# Patient Record
Sex: FEMALE | Race: WHITE | NOT HISPANIC OR LATINO | Employment: OTHER | URBAN - METROPOLITAN AREA
[De-identification: names, ages, dates, MRNs, and addresses within clinical notes are randomized per-mention and may not be internally consistent; named-entity substitution may affect disease eponyms.]

---

## 2017-02-03 ENCOUNTER — ALLSCRIPTS OFFICE VISIT (OUTPATIENT)
Dept: OTHER | Facility: OTHER | Age: 67
End: 2017-02-03

## 2017-02-04 LAB
T3FREE SERPL-MCNC: 234 NG/DL (ref 71–180)
T4 FREE SERPL-MCNC: 2.09 NG/DL (ref 0.82–1.77)
TSH SERPL DL<=0.05 MIU/L-ACNC: 0.03 UIU/ML (ref 0.45–4.5)

## 2017-02-07 ENCOUNTER — GENERIC CONVERSION - ENCOUNTER (OUTPATIENT)
Dept: OTHER | Facility: OTHER | Age: 67
End: 2017-02-07

## 2017-04-12 ENCOUNTER — GENERIC CONVERSION - ENCOUNTER (OUTPATIENT)
Dept: OTHER | Facility: OTHER | Age: 67
End: 2017-04-12

## 2018-01-13 VITALS
BODY MASS INDEX: 20.66 KG/M2 | HEIGHT: 65 IN | WEIGHT: 124 LBS | DIASTOLIC BLOOD PRESSURE: 70 MMHG | TEMPERATURE: 97.9 F | SYSTOLIC BLOOD PRESSURE: 100 MMHG

## 2018-01-14 NOTE — MISCELLANEOUS
Message  Message Free Text Note Form: TC from daughter Katty Tirado   States Mom saw Dr Carley Whitmore on Thursday and was under the impression that he was going to prescribe a diuretic for her edema  After reviewing the note from 9/29, daughter states that mother is improved from when she was seen with decreased edema and decreased SOB  Was advised that ER with possible hospitalization was discussed with patient but daughter feels that, with decrease in symptoms, she will call the cardiologist in the am  Reviewed S/S of worsening CHF and daughter will take mom to er if symptoms worsen        Signatures   Electronically signed by : Sondra Doss; Oct  2 2016  1:32PM EST                       (Author)

## 2018-01-14 NOTE — MISCELLANEOUS
Assessment    1  Hyperthyroidism (242 90) (E05 90)   2  Atrial fibrillation (427 31) (I48 91)    Discussion/Summary  Discussion Summary:   In summary then this is a 12-year-old woman with hyperthyroidism which is now treated with methimazole  Heart rate is controlled today though she remains in atrial fibrillation  She is on Xarelto and has no evidence of bleeding diathesis  She is also on metoprolol and lisinopril  She has follow-up with cardiology scheduled later this month and follow-up with endocrinology in November  We'll see her back in 4 months or sooner as needed  She's given an influenza vaccine today  Medication SE Review and Pt Understands Tx: Possible side effects of new medications were reviewed with the patient/guardian today  The treatment plan was reviewed with the patient/guardian  The patient/guardian understands and agrees with the treatment plan      History of Present Illness  TCM Communication [facility]: The patient is being contacted for follow-up after hospitalization  Hospital records were reviewed  She was hospitalized at [facility]  The dates of hospitalization: 10/2/16-10/4/16, date of admission: 10/2/16, date of discharge: 10/4/16  Diagnosis: Atrial Fibrillation, CHF, hyperthyroidism  She was discharged to home  Medications reviewed and updated today  She scheduled a follow up appointment  Follow-up appointments with other specialists: Dr Lelo Sommers, [facility]  The patient is currently asymptomatic  Counseling was provided to the patient  Topics counseled included importance of compliance with treatment  Communication performed and completed by Kevin Lovett      Active Problems    1  Atrial fibrillation (427 31) (I48 91)   2  Cellulitis of hand (682 4) (L03 119)   3  Encounter for screening colonoscopy (V76 51) (Z12 11)   4  Encounter for screening mammogram for breast cancer (V76 12) (Z12 31)   5   Hyperthyroidism (242 90) (E05 90)    Social History    · Current every day smoker (305 1) (F17 200)    Current Meds   1  Diltiazem HCl ER Coated Beads 120 MG Oral Capsule Extended Release 24 Hour;   TAKE 1 CAPSULE DAILY  Requested for: 70JWP3052; Last Rx:[id number] Ordered    Allergies    1  Bactrim TABS   2  Biaxin TABS    Vitals  Signs   Recorded: 60YUH1254 11:19AM   Heart Rate: 88  Recorded: 82VTW8256 56:80LU   Systolic: 775, LUE, Sitting  Diastolic: 70, LUE, Sitting  Temperature: 98 3 F  Height: 5 ft 4 5 in  Weight: 111 lb   BMI Calculated: 18 76  BSA Calculated: 1 53    Physical Exam    Constitutional   General appearance: No acute distress, well appearing and well nourished  Pulmonary   Respiratory effort: No increased work of breathing or signs of respiratory distress  Auscultation of lungs: Clear to auscultation  Cardiovascular   Auscultation of heart: Abnormal   The heart rate was normal at 88 bpm  The rhythm was irregularly irregular  Heart sounds: normal S1 and normal S2  no murmurs were heard  Examination of extremities for edema and/or varicosities: Normal     Carotid pulses: Normal   No JVD  Musculoskeletal   Digits and nails: Normal without clubbing or cyanosis  Normal capillary RF     Psychiatric   Orientation to person, place, and time: Normal     Mood and affect: Normal          Signatures   Electronically signed by : MARCELLO Hamilton ; Oct 10 2016 11:22AM EST                       (Author)

## 2018-01-14 NOTE — RESULT NOTES
Verified Results  (1) T3 TOTAL [id number] 12:00AM Dorthula Kishor     Test Name Result Flag Reference   Triiodothyronine (T3) 234 ng/dL H      (LC) TSH+Free T4 [id number] 12:00AM Dorthula Kishor     Test Name Result Flag Reference   TSH 0 025 uIU/mL L 0 450-4 500   T4,Free(Direct) 2 09 ng/dL H 0 82-1 77

## 2018-01-15 NOTE — PROGRESS NOTES
History of Present Illness  Care Coordination Encounter Information:   Type of Encounter: Telephonic    Spoke to Patient  Care Coordination [facility] Nurse [facility]:   The reason for call is to discuss outreach for follow up/needed services  Spoke to Joselin Kaur in reference to recent hospitalization and to see how she is feeling  She said she "feels good" and she spoke to the office to schedule an appointment and will bring hospital discharge summary for Dr Tatyana Lopez  Admit to [facility] on Oct 2 and discharged on Oct 4  New medications are as follow:  Metoprolol 25 mgs once daily  Rivaroxaban 20 mgs at night  Lisinopril 2 5 mgs once daily  Methimazole 15 mgs 3 pills in am; 3 pills in pm   She is also waiting for a return call from Dr Belle Murray (endocrinologist) who she will be seeing for her hyperthyroidism  She is to call me if she has additional questions prior to physician's appointment  Active Problems    1  Atrial fibrillation (427 31) (I48 91)   2  Cellulitis of hand (682 4) (L03 119)   3  Encounter for screening colonoscopy (V76 51) (Z12 11)   4  Encounter for screening mammogram for breast cancer (V76 12) (Z12 31)   5  Hyperthyroidism (242 90) (E05 90)    Social History    · Current every day smoker (305 1) (F17 200)    Current Meds    1  Diltiazem HCl ER Coated Beads 120 MG Oral Capsule Extended Release 24 Hour;   TAKE 1 CAPSULE DAILY  Requested for: [id number]; Last Rx:[id number] Ordered    Allergies    1  Bactrim TABS   2  Biaxin TABS    End of Encounter Meds    1  Diltiazem HCl ER Coated Beads 120 MG Oral Capsule Extended Release 24 Hour;   TAKE 1 CAPSULE DAILY  Requested for: [id number]; Last Rx:[id number] Ordered    Future Appointments    Date/Time Provider Specialty Site   10/10/2016 10:15 AM MARCELLO Honeycutt   Family Medicine [address] [place] [address]     Signatures   Electronically signed by : Zainab Moe RN; Oct  6 2016 11:24AM EST                       (Author)

## 2018-03-27 ENCOUNTER — OFFICE VISIT (OUTPATIENT)
Dept: ENDOCRINOLOGY | Facility: HOSPITAL | Age: 68
End: 2018-03-27
Payer: COMMERCIAL

## 2018-03-27 VITALS
DIASTOLIC BLOOD PRESSURE: 64 MMHG | WEIGHT: 147.6 LBS | BODY MASS INDEX: 24.59 KG/M2 | HEIGHT: 65 IN | SYSTOLIC BLOOD PRESSURE: 108 MMHG | HEART RATE: 84 BPM

## 2018-03-27 DIAGNOSIS — E05.90 HYPERTHYROIDISM: ICD-10-CM

## 2018-03-27 DIAGNOSIS — E05.00 GRAVES DISEASE: Primary | ICD-10-CM

## 2018-03-27 PROCEDURE — 99203 OFFICE O/P NEW LOW 30 MIN: CPT | Performed by: INTERNAL MEDICINE

## 2018-03-27 RX ORDER — LISINOPRIL 2.5 MG/1
TABLET ORAL DAILY
Refills: 4 | COMMUNITY
Start: 2018-03-17

## 2018-03-27 RX ORDER — METOPROLOL SUCCINATE 25 MG/1
TABLET, EXTENDED RELEASE ORAL DAILY
Refills: 4 | COMMUNITY
Start: 2018-03-17

## 2018-03-27 RX ORDER — METHIMAZOLE 5 MG/1
2.5 TABLET ORAL DAILY
COMMUNITY
End: 2018-08-01 | Stop reason: SDUPTHER

## 2018-03-27 RX ORDER — RIVAROXABAN 20 MG/1
20 TABLET, FILM COATED ORAL
Refills: 4 | COMMUNITY
Start: 2018-03-17

## 2018-03-27 NOTE — PATIENT INSTRUCTIONS
Last blood work in February was underactive  I agree with stopping Methimazole last month  I recommend repeat blood work in mid April 2018  Call 1 week after blood work for the results if you don't hear from us  Follow up visits to be determined based on blood work  Graves Disease   WHAT YOU NEED TO KNOW:   What is Graves disease? Graves disease is an autoimmune disease that causes your immune system to attack your thyroid gland  This causes your body to make too much thyroid hormone and leads to hyperthyroidism  The thyroid gland is a butterfly-shaped organ that is found in the front part of your neck  Thyroid hormones regulate body temperature, heart rate, and weight  What increases my risk for Graves disease? The exact cause of Graves disease is unknown  Certain things may increase your risk of Graves disease  This includes having a family history of Graves disease or being female  Conditions such as type 1 diabetes, rheumatoid arthritis, and vitiligo (a skin disorder) may also increase your risk  Graves disease is more common among females and people younger than 36  Pregnancy affects the thyroid and can trigger Graves disease in some women  What are the signs and symptoms of Graves disease? · Nervousness, irritability, fatigue, or muscle weakness    · Trouble sleeping or increased sensitivity to heat    · Hand tremors or increased sweating    · An irregular or fast heartbeat    · Diarrhea or more frequent bowel movements than usual    · Losing weight without trying    · Children may have a decreased attention span that leads to a drop in school grades    · Symptoms of Graves ophthalmology (GO) such as protruding eyeballs, puffy eyelids, double vision, light sensitivity, or pain or pressure in your eyes    · Reddening or thickening of skin on the shins  How is Graves disease diagnosed? Your healthcare provider will examine you   He may gently feel the area over your thyroid gland for swelling or xochitl  He will also ask about your symptoms and any medical conditions you have  You may also need the following tests:  · Blood tests  will be done to measure your thyroid levels  They may also be done to check for antibodies that show you may have Graves disease  · Radioactive iodine uptake tests  may be done to show how well your thyroid works  It may show that your thyroid gland is producing too much hormone  · A thyroid scan  may be done to show the size, shape, and position of your thyroid  A small amount of radioactive tracer is given to create pictures of your thyroid  What is a thyroid storm? A thyroid storm happens when your thyroid hormone levels get too high  Your body temperature may go very high, your heart may beat very fast, and you may have problems thinking  You may have increased sweating, vomiting, or diarrhea  You may have seizures or go into a coma  Thyroid storm may happen if you have hyperthyroidism and get an infection or stop taking your thyroid medicine  Injuries, burns, and certain medicines can also cause a thyroid storm  How is Graves disease treated? · Antithyroid medicines  decrease thyroid hormone levels and your symptoms  · Radioactive iodine  is given to damage or kill some thyroid gland cells  This may decrease the amount of thyroid hormone produced  Tell your healthcare provider if you are breastfeeding, or you know or think you are pregnant  This medicine can be harmful to your baby  · Heart medicine  may be given to control and regulate your heart rate  · Treatment for eye problems  may also be needed  Eye drops can help relieve dry or irritated eyes  Steroids may be given to decrease eye swelling and pain  Special lenses may be needed if you have double vision  Radiation may be applied to your eyes to decrease swelling if you have severe eye problems  · Surgery  may be done to remove all or part of the thyroid gland  How can I manage Graves disease? · Do not smoke or be around secondhand smoke  Cigarette smoke increases your risk of GO or can make it worse if you already have it  Nicotine and other chemicals in cigarettes and cigars can also cause lung damage  Ask your healthcare provider for information if you currently smoke and need help to quit  E-cigarettes or smokeless tobacco still contain nicotine  Talk to your healthcare provider before you use these products  · Sleep with your head elevated if you have eye symptoms  This may help to decrease swelling of your eyelids  Your healthcare provider may recommend that you sleep with your eyes taped shut  This can help to prevent dry eyes  · Sunglasses  may help to decrease light sensitivity  · Take medicine as directed and go to follow-up appointments  Do not stop taking your medicine unless your healthcare provider has asked you to  This could cause your thyroid levels to increase and lead to other health problems  You will need regular follow-up appointments to have your thyroid levels checked  Call 911 or have someone call 911 for any of the following:   · You have a seizure  · You feel like you are going to faint  · You have sudden chest pain or shortness of breath  When should I seek immediate care? · You have a fever  · You have trouble thinking clearly  · You are shaking or sweating  · Your heart is beating faster than usual, or you have an irregular heartbeat  When should I contact my healthcare provider? · You have nausea, vomiting, or diarrhea  · You feel nervous, restless, confused or agitated       · You run out of thyroid medicine, or you have stopped taking it  · You have questions or concerns about your condition or care  CARE AGREEMENT:   You have the right to help plan your care  Learn about your health condition and how it may be treated  Discuss treatment options with your caregivers to decide what care you want to receive   You always have the right to refuse treatment  The above information is an  only  It is not intended as medical advice for individual conditions or treatments  Talk to your doctor, nurse or pharmacist before following any medical regimen to see if it is safe and effective for you  © 2017 [employer] Information is for End User's use only and may not be sold, redistributed or otherwise used for commercial purposes  All illustrations and images included in CareNotes® are the copyrighted property of [employer]  or [employer]  Graves Disease   AMBULATORY CARE:   Graves disease  is an autoimmune disease that causes your immune system to attack your thyroid gland  This causes your body to make too much thyroid hormone and leads to hyperthyroidism  The thyroid gland is a butterfly-shaped organ that is found in the front part of your neck  Thyroid hormones regulate body temperature, heart rate, and weight  Common signs and symptoms include the following:   · Nervousness, irritability, fatigue, or muscle weakness    · Trouble sleeping or increased sensitivity to heat    · Hand tremors or increased sweating    · An irregular or fast heartbeat    · Diarrhea or more frequent bowel movements than usual    · Losing weight without trying    · Children may have a decreased attention span that leads to a drop in school grades    · Symptoms of Graves ophthalmology (GO) such as protruding eyeballs, puffy eyelids, double vision, light sensitivity, or pain or pressure in your eyes    · Reddening or thickening of skin on the shins  Thyroid storm:  A thyroid storm happens when your thyroid hormone levels get too high  Your body temperature may go very high, your heart may beat very fast, and you may have problems thinking  You may have increased sweating, vomiting, or diarrhea  You may have seizures or go into a coma   Thyroid storm may happen if you have hyperthyroidism and get an infection or stop taking your thyroid medicine  Injuries, burns, and certain medicines can also cause a thyroid storm  Call 911 or have someone call 911 for any of the following:   · You have a seizure  · You feel like you are going to faint  · You have sudden chest pain or shortness of breath  Seek care immediately if:   · You have a fever  · You have trouble thinking clearly  · You are shaking or sweating  · Your heart is beating faster than usual, or you have an irregular heartbeat  Contact your healthcare provider if:   · You have nausea, vomiting, or diarrhea  · You feel nervous, restless, confused or agitated       · You run out of thyroid medicine, or you have stopped taking it  · You have questions or concerns about your condition or care  Treatment for Graves disease  may include any of the following:  · Antithyroid medicines  decrease thyroid hormone levels and your symptoms  · Radioactive iodine  is given to damage or kill some thyroid gland cells  This may decrease the amount of thyroid hormone produced  Tell your healthcare provider if you are breastfeeding, or you know or think you are pregnant  This medicine can be harmful to your baby  · Heart medicine  may be given to control and regulate your heart rate  · Treatment for eye problems  may also be needed  Eye drops can help to relieve dry or irritated eyes  Steroids may be given to decrease eye swelling and pain  Special lenses may be needed if you have double vision  Radiation may be applied to your eyes to decrease swelling if you have severe eye problems  · Surgery  may be done to remove all or part of the thyroid gland  Manage Graves disease:   · Do not smoke or be around secondhand smoke  Cigarette smoke increases your risk of GO or can make it worse if you already have it  Nicotine and other chemicals in cigarettes and cigars can also cause lung damage   Ask your healthcare provider for information if you currently smoke and need help to quit  E-cigarettes or smokeless tobacco still contain nicotine  Talk to your healthcare provider before you use these products  · Sleep with your head elevated if you have eye symptoms  This may help to decrease swelling of your eyelids  Your healthcare provider may recommend that you sleep with your eyes taped shut  This can help to prevent dry eyes  · Sunglasses  may help decrease light sensitivity  · Take medicine as directed and go to follow-up appointments  Do not stop taking your medicine unless your healthcare provider has asked you to  This could increase your thyroid levels and lead to other health problems  You will need to go to regular follow-up appointments to have your thyroid levels checked  Follow up with your healthcare provider as directed: You may need to return for regular blood tests to check your thyroid hormone levels  This will help your healthcare provider decide if you are getting the right amount of medicine  Do not stop taking your medicines without talking to your healthcare provider first  Write down your questions so you remember to ask them during your visits  © 2017 [employer] [facility] Information is for End User's use only and may not be sold, redistributed or otherwise used for commercial purposes  All illustrations and images included in CareNotes® are the copyrighted property of [employer]  or [employer]  The above information is an  only  It is not intended as medical advice for individual conditions or treatments  Talk to your doctor, nurse or pharmacist before following any medical regimen to see if it is safe and effective for you

## 2018-03-27 NOTE — LETTER
March 27, 2018     [address]  [address] [place] [address]    Patient: Mehrdad Randhawa   YOB: 1950   Date of Visit: 3/27/2018       Dear Dr Coley Ano:    Thank you for referring Mehrdad Randhawa to me for evaluation  Below are my notes for this consultation  If you have questions, please do not hesitate to call me  I look forward to following your patient along with you  Sincerely,        Belen Vegas MD        CC: No Recipients  Belen Vegas MD  3/27/2018 12:25 PM  Sign at close encounter  3/27/2018    Assessment/Plan      Diagnoses and all orders for this visit:    Graves disease  -     T4, free Lab Collect  -     Thyroid stimulating immunoglobulin Lab Collect  -     TSH, 3rd generation Lab Collect  -     T3, free    Hyperthyroidism  -     T4, free Lab Collect  -     Thyroid stimulating immunoglobulin Lab Collect  -     TSH, 3rd generation Lab Collect  -     T3, free    Other orders  -     lisinopril (ZESTRIL) 2 5 mg tablet; Take by mouth    -     metoprolol succinate (TOPROL-XL) 25 mg 24 hr tablet; Take by mouth daily    -     XARELTO 20 MG tablet; Take 20 mg by mouth daily with breakfast    -     methimazole (TAPAZOLE) 5 mg tablet; Take 3 tablets by mouth 2 (two) times a day        Assessment/Plan:  Hyperthyroidism likely due to Graves disease  She was diagnosed with hyperthyroidism in October 2016 with a hospitalization for atrial fibrillation  Thyroid function tests show she was profoundly hyperthyroid with a fully suppressed TSH and markedly elevated free T4 and free T3  She was appropriately started on antithyroid medications  She has had struggles with hypothyroidism on these medicines and recently had her methimazole discontinued in February of 2018  I agree with this discontinuation based on the fact that her TSH was elevated in February 2018    I will have her repeat her TSH with a free T4, free T3, and thyroid-stimulating immunoglobulins in mid April 2018 to see if she is in remission off antithyroid medications  She was instructed to viktoriya the office 1 week after the blood work for results and follow up recommendations  While in the office, we did discuss the 3 treatments of hyperthyroidism due to Graves disease including antithyroid medications, I 131 treatment to ablate her thyroid, and surgery  She was educated that if she is in remission, she could developed hyperthyroidism again and should call if she develops hyperthyroid symptoms which were reviewed in the office  Follow-up will be determined by the blood work in April  CC: thyroid consult    History of Present Illness     HPI: Gail Bailey is a 69y o  year old female with history of hyperthyroidism here for evaluation of her thyroid  She was found with rapid heart beat by her PCP about 1 1/2year ago  She had shortness of breath, ended up in hospital October 2016 with atrial fibrillation and hyperthyroidism  She was started on Methimazole 5 mg BID  She also started lisinopril, metoprolol, and Xarelto  Thyroid levels in hospital showed TSH <0 02, Free T4 3 00, and Free T3 8 5  She was seeing another endocrinologist, but not feeling better and some personality differences  She saw the endocrinlologist last in February 2018 and was told she was in remission but still on methimazlole low dose and told to stop in february  Was on methimazole 15 mg Bid and dose tapered to 5 mg 1/2 daily  Thyroid hormone levels have fluctuated over the last year with a TSH from 0 025 to over 45  Did have abnormal thyroid tests 2014 and patient did not treat at that time  Off methimazole since about 2/20/18  She denies heat or cold intolerance  She denies tremors, anxiety, or insomnia  She has been having some recent fatigue and constipation  She has no diarrhea  She will still get an occasional palpitation  She denies dry skin, brittle nails, or hair loss    She did have a 40 lb weight loss prior to her diagnosis and has gained most of that weight back  She has no diplopia  She denies troubles with swallowing  Review of Systems   Constitutional: Positive for fatigue and unexpected weight change  Negative for diaphoresis  Had lost to 115 lbs on diagnosis of thyroid disease, had lost over 40 lbs  Uses a lot of vitamin from standard process: Cataplex B, Cataplex C, cardioplus, trace minerals  Also takes a thyroid support supplements  HENT: Positive for tinnitus  Negative for ear pain, hearing loss and trouble swallowing  Right ear tinnitus for years  Eyes: Positive for visual disturbance  No diplopia  Some occasional blurry vision  Respiratory: Negative for apnea and shortness of breath  Cardiovascular: Positive for palpitations and leg swelling  Negative for chest pain  Now an occasional palpitation  Sees cardiologist regularly  Had leg swelling on diagnosis of thyroid disease  Gastrointestinal: Positive for constipation  Negative for abdominal pain, diarrhea and nausea  Endocrine: Negative for cold intolerance and heat intolerance  Had hands and feet cold in the past worse with diagnosis, now gone  Musculoskeletal: Negative for arthralgias and back pain  Skin: Negative for rash  No dry skin, brittle nails, or hair loss  Neurological: Negative for dizziness, tremors, light-headedness, numbness and headaches  Psychiatric/Behavioral: Negative for sleep disturbance  The patient is not nervous/anxious  Was anxious with panic attacks when thyroid overactive  also had insomnia then too         Historical Information   Past Medical History:   Diagnosis Date    Atrial fibrillation ([address] )     Hypertension     with hyperthyroidism    Osteoarthritis      Past Surgical History:   Procedure Laterality Date    TUBAL LIGATION Bilateral      Social History   History   Alcohol Use No     History   Drug Use No     History   Smoking Status    Current Every Day Smoker    Packs/day: 0 50    Years: 0 00    Types: Cigarettes   Smokeless Tobacco    Never Used     Family History:   Family History   Problem Relation Age of Onset    Thyroid disease unspecified Mother     No Known Problems Father     Hypothyroidism Sister     Hashimoto's thyroiditis Sister     Goiter Sister     No Known Problems Brother     No Known Problems Sister     No Known Problems Sister     No Known Problems Brother     No Known Problems Brother     No Known Problems Son     No Known Problems Daughter        Meds/Allergies   Current Outpatient Prescriptions   Medication Sig Dispense Refill    lisinopril (ZESTRIL) 2 5 mg tablet Take by mouth    4    metoprolol succinate (TOPROL-XL) 25 mg 24 hr tablet Take by mouth daily    4    XARELTO 20 MG tablet Take 20 mg by mouth daily with breakfast    4    methimazole (TAPAZOLE) 5 mg tablet Take 3 tablets by mouth 2 (two) times a day       No current facility-administered medications for this visit  Allergies   Allergen Reactions    Sulfamethoxazole-Trimethoprim        Objective   Vitals: Blood pressure 108/64, pulse 84, height 5' 4 57" (1 64 m), weight 67 kg (147 lb 9 6 oz)  Invasive Devices          No matching active lines, drains, or airways          Physical Exam   Constitutional: She is oriented to person, place, and time  She appears well-developed and well-nourished  HENT:   Head: Normocephalic and atraumatic  Eyes: Conjunctivae and EOM are normal  Pupils are equal, round, and reactive to light  No lid lag, stare, proptosis, or periorbital edema  Neck: Normal range of motion  Neck supple  No thyromegaly present  No bruits over the thyroid  Cardiovascular: Normal rate, regular rhythm, normal heart sounds and intact distal pulses  No murmur heard  Pulmonary/Chest: Effort normal and breath sounds normal  She has no wheezes  Abdominal: Soft  Bowel sounds are normal  There is no tenderness     Musculoskeletal: Normal range of motion  She exhibits no edema or deformity  No tremor of the outstretched hands  No CVAT  NO spinous process tenderness  Lymphadenopathy:     She has no cervical adenopathy  Neurological: She is alert and oriented to person, place, and time  She has normal reflexes  Skin: Skin is warm and dry  No rash noted  Vitals reviewed  The history was obtained from the review of the chart and from the patient  Lab Results:    Bloodwork on February 13, 2018 showed a TSH of 9 92 with a free T4 of 1 0 and free T3 of 2 5  AST and ALT were normal  Blood work over the last year has had the TSH fluctuate from 0 025 to over 45  Lab Results   Component Value Date    FREET4 2 09 (H) 02/03/2017       Recent Results (from the past 77120 hour(s))   TSH+Free T4 (Historical)    Collection Time: 02/03/17 12:00 AM   Result Value Ref Range    TSH 3RD GENERATON 0 025 (L) 0 450 - 4 500 uIU/mL    Free T4 2 09 (H) 0 82 - 1 77 ng/dL   T3,TOTAL (HISTORICAL)    Collection Time: 02/03/17 12:00 AM   Result Value Ref Range    T3, Free 234 (H) 71 - 180 ng/dL         No future appointments

## 2018-03-27 NOTE — PROGRESS NOTES
3/27/2018    Assessment/Plan      Diagnoses and all orders for this visit:    Graves disease  -     T4, free Lab Collect  -     Thyroid stimulating immunoglobulin Lab Collect  -     TSH, 3rd generation Lab Collect  -     T3, free    Hyperthyroidism  -     T4, free Lab Collect  -     Thyroid stimulating immunoglobulin Lab Collect  -     TSH, 3rd generation Lab Collect  -     T3, free    Other orders  -     lisinopril (ZESTRIL) 2 5 mg tablet; Take by mouth    -     metoprolol succinate (TOPROL-XL) 25 mg 24 hr tablet; Take by mouth daily    -     XARELTO 20 MG tablet; Take 20 mg by mouth daily with breakfast    -     methimazole (TAPAZOLE) 5 mg tablet; Take 3 tablets by mouth 2 (two) times a day        Assessment/Plan:  Hyperthyroidism likely due to Graves disease  She was diagnosed with hyperthyroidism in October 2016 with a hospitalization for atrial fibrillation  Thyroid function tests show she was profoundly hyperthyroid with a fully suppressed TSH and markedly elevated free T4 and free T3  She was appropriately started on antithyroid medications  She has had struggles with hypothyroidism on these medicines and recently had her methimazole discontinued in February of 2018  I agree with this discontinuation based on the fact that her TSH was elevated in February 2018  I will have her repeat her TSH with a free T4, free T3, and thyroid-stimulating immunoglobulins in mid April 2018 to see if she is in remission off antithyroid medications  She was instructed to viktoriya the office 1 week after the blood work for results and follow up recommendations  While in the office, we did discuss the 3 treatments of hyperthyroidism due to Graves disease including antithyroid medications, I 131 treatment to ablate her thyroid, and surgery  She was educated that if she is in remission, she could developed hyperthyroidism again and should call if she develops hyperthyroid symptoms which were reviewed in the office    Follow-up will be determined by the blood work in April  CC: thyroid consult    History of Present Illness     HPI: Matti Castañeda is a 69y o  year old female with history of hyperthyroidism here for evaluation of her thyroid  She was found with rapid heart beat by her PCP about 1 1/2year ago  She had shortness of breath, ended up in hospital October 2016 with atrial fibrillation and hyperthyroidism  She was started on Methimazole 5 mg BID  She also started lisinopril, metoprolol, and Xarelto  Thyroid levels in hospital showed TSH <0 02, Free T4 3 00, and Free T3 8 5  She was seeing another endocrinologist, but not feeling better and some personality differences  She saw the endocrinlologist last in February 2018 and was told she was in remission but still on methimazlole low dose and told to stop in february  Was on methimazole 15 mg Bid and dose tapered to 5 mg 1/2 daily  Thyroid hormone levels have fluctuated over the last year with a TSH from 0 025 to over 45  Did have abnormal thyroid tests 2014 and patient did not treat at that time  Off methimazole since about 2/20/18  She denies heat or cold intolerance  She denies tremors, anxiety, or insomnia  She has been having some recent fatigue and constipation  She has no diarrhea  She will still get an occasional palpitation  She denies dry skin, brittle nails, or hair loss  She did have a 40 lb weight loss prior to her diagnosis and has gained most of that weight back  She has no diplopia  She denies troubles with swallowing  Review of Systems   Constitutional: Positive for fatigue and unexpected weight change  Negative for diaphoresis  Had lost to 115 lbs on diagnosis of thyroid disease, had lost over 40 lbs  Uses a lot of vitamin from standard process: Cataplex B, Cataplex C, cardioplus, trace minerals  Also takes a thyroid support supplements  HENT: Positive for tinnitus  Negative for ear pain, hearing loss and trouble swallowing           Right ear tinnitus for years  Eyes: Positive for visual disturbance  No diplopia  Some occasional blurry vision  Respiratory: Negative for apnea and shortness of breath  Cardiovascular: Positive for palpitations and leg swelling  Negative for chest pain  Now an occasional palpitation  Sees cardiologist regularly  Had leg swelling on diagnosis of thyroid disease  Gastrointestinal: Positive for constipation  Negative for abdominal pain, diarrhea and nausea  Endocrine: Negative for cold intolerance and heat intolerance  Had hands and feet cold in the past worse with diagnosis, now gone  Musculoskeletal: Negative for arthralgias and back pain  Skin: Negative for rash  No dry skin, brittle nails, or hair loss  Neurological: Negative for dizziness, tremors, light-headedness, numbness and headaches  Psychiatric/Behavioral: Negative for sleep disturbance  The patient is not nervous/anxious  Was anxious with panic attacks when thyroid overactive  also had insomnia then too         Historical Information   Past Medical History:   Diagnosis Date    Atrial fibrillation ([address] )     Hypertension     with hyperthyroidism    Osteoarthritis      Past Surgical History:   Procedure Laterality Date    TUBAL LIGATION Bilateral      Social History   History   Alcohol Use No     History   Drug Use No     History   Smoking Status    Current Every Day Smoker    Packs/day: 0 50    Years: 0 00    Types: Cigarettes   Smokeless Tobacco    Never Used     Family History:   Family History   Problem Relation Age of Onset    Thyroid disease unspecified Mother     No Known Problems Father     Hypothyroidism Sister     Hashimoto's thyroiditis Sister    Jorge Abt Goiter Sister     No Known Problems Brother     No Known Problems Sister     No Known Problems Sister     No Known Problems Brother     No Known Problems Brother     No Known Problems Son     No Known Problems Daughter Meds/Allergies   Current Outpatient Prescriptions   Medication Sig Dispense Refill    lisinopril (ZESTRIL) 2 5 mg tablet Take by mouth    4    metoprolol succinate (TOPROL-XL) 25 mg 24 hr tablet Take by mouth daily    4    XARELTO 20 MG tablet Take 20 mg by mouth daily with breakfast    4    methimazole (TAPAZOLE) 5 mg tablet Take 3 tablets by mouth 2 (two) times a day       No current facility-administered medications for this visit  Allergies   Allergen Reactions    Sulfamethoxazole-Trimethoprim        Objective   Vitals: Blood pressure 108/64, pulse 84, height 5' 4 57" (1 64 m), weight 67 kg (147 lb 9 6 oz)  Invasive Devices          No matching active lines, drains, or airways          Physical Exam   Constitutional: She is oriented to person, place, and time  She appears well-developed and well-nourished  HENT:   Head: Normocephalic and atraumatic  Eyes: Conjunctivae and EOM are normal  Pupils are equal, round, and reactive to light  No lid lag, stare, proptosis, or periorbital edema  Neck: Normal range of motion  Neck supple  No thyromegaly present  No bruits over the thyroid  Cardiovascular: Normal rate, regular rhythm, normal heart sounds and intact distal pulses  No murmur heard  Pulmonary/Chest: Effort normal and breath sounds normal  She has no wheezes  Abdominal: Soft  Bowel sounds are normal  There is no tenderness  Musculoskeletal: Normal range of motion  She exhibits no edema or deformity  No tremor of the outstretched hands  No CVAT  NO spinous process tenderness  Lymphadenopathy:     She has no cervical adenopathy  Neurological: She is alert and oriented to person, place, and time  She has normal reflexes  Skin: Skin is warm and dry  No rash noted  Vitals reviewed  The history was obtained from the review of the chart and from the patient      Lab Results:    Bloodwork on February 13, 2018 showed a TSH of 9 92 with a free T4 of 1 0 and free T3 of 2 5   AST and ALT were normal  Blood work over the last year has had the TSH fluctuate from 0 025 to over 45  Lab Results   Component Value Date    FREET4 2 09 (H) 02/03/2017       Recent Results (from the past 53641 hour(s))   TSH+Free T4 (Historical)    Collection Time: 02/03/17 12:00 AM   Result Value Ref Range    TSH 3RD GENERATON 0 025 (L) 0 450 - 4 500 uIU/mL    Free T4 2 09 (H) 0 82 - 1 77 ng/dL   T3,TOTAL (HISTORICAL)    Collection Time: 02/03/17 12:00 AM   Result Value Ref Range    T3, Free 234 (H) 71 - 180 ng/dL         No future appointments

## 2018-04-19 LAB
T3FREE SERPL-MCNC: 4.3 PG/ML (ref 2–4.4)
T4 FREE SERPL-MCNC: 1.59 NG/DL (ref 0.82–1.77)
TSH SERPL DL<=0.005 MIU/L-ACNC: 0.03 UIU/ML (ref 0.45–4.5)
TSI SER-ACNC: 1.96 IU/L (ref 0–0.55)

## 2018-04-25 ENCOUNTER — TELEPHONE (OUTPATIENT)
Dept: ENDOCRINOLOGY | Facility: HOSPITAL | Age: 68
End: 2018-04-25

## 2018-04-25 NOTE — TELEPHONE ENCOUNTER
Pt had labs done 4/17/18 at [facility]- they are in [employer]  She is questioning results   Thanks

## 2018-04-26 DIAGNOSIS — E05.90 HYPERTHYROIDISM: Primary | ICD-10-CM

## 2018-07-25 LAB
T3FREE SERPL-MCNC: 3.3 PG/ML (ref 2–4.4)
T4 SERPL-MCNC: 7.5 UG/DL (ref 4.5–12)
TSH SERPL DL<=0.005 MIU/L-ACNC: 1.22 UIU/ML (ref 0.45–4.5)

## 2018-08-01 ENCOUNTER — OFFICE VISIT (OUTPATIENT)
Dept: ENDOCRINOLOGY | Facility: HOSPITAL | Age: 68
End: 2018-08-01
Payer: COMMERCIAL

## 2018-08-01 VITALS
HEART RATE: 88 BPM | HEIGHT: 65 IN | WEIGHT: 146.2 LBS | SYSTOLIC BLOOD PRESSURE: 118 MMHG | DIASTOLIC BLOOD PRESSURE: 76 MMHG | BODY MASS INDEX: 24.36 KG/M2

## 2018-08-01 DIAGNOSIS — E05.90 HYPERTHYROIDISM: Primary | ICD-10-CM

## 2018-08-01 DIAGNOSIS — E05.00 GRAVES DISEASE: ICD-10-CM

## 2018-08-01 PROCEDURE — 99213 OFFICE O/P EST LOW 20 MIN: CPT | Performed by: INTERNAL MEDICINE

## 2018-08-01 RX ORDER — METHIMAZOLE 5 MG/1
2.5 TABLET ORAL DAILY
Qty: 90 TABLET | Refills: 2 | Status: SHIPPED | OUTPATIENT
Start: 2018-08-01 | End: 2019-03-07 | Stop reason: SDUPTHER

## 2018-08-01 NOTE — PROGRESS NOTES
8/1/2018    Assessment/Plan      Diagnoses and all orders for this visit:    Hyperthyroidism  -     TSH, 3rd generation Lab Collect; Future  -     T4, free Lab Collect; Future  -     T3, free; Future  -     methimazole (TAPAZOLE) 5 mg tablet; Take 0 5 tablets (2 5 mg total) by mouth daily    Graves disease  -     TSH, 3rd generation Lab Collect; Future  -     T4, free Lab Collect; Future  -     T3, free; Future  -     methimazole (TAPAZOLE) 5 mg tablet; Take 0 5 tablets (2 5 mg total) by mouth daily    Other orders  -     b complex vitamins tablet; Take 1 tablet by mouth daily        Assessment/Plan:  Hyperthyroidism due to Graves disease  Her most recent thyroid function tests do show she is biochemically euthyroid  At this point, she will continue the same methimazole 5 mg a half a tablet or 2 5 mg daily  I will have her follow up in 3 months with preceding TSH, free T4, and free T3       CC: thyroid follow up    History of Present Illness     HPI: Brigitte Mao is a 69y o  year old female with history of hyperthyroidism due to grave's disease  Thyroid was becoming overactive again in aprill 2018 and we staarted low does methimazole 5 mg 1/2 tablet daily  She denies heat or cold intolerance  She denies diarrhea or constipation  She denies palpitation, tremors, anxiety, depression, insomnia, or weight changes  She will get intermittent fatigue  She denies dry skin, brittle nails, or hair loss  She has no diplopia  Review of Systems   Constitutional: Positive for fatigue  Negative for unexpected weight change  Spurts of fatigue  HENT: Negative for hearing loss  Eyes: Negative for visual disturbance  No diplopia  Respiratory: Negative for chest tightness and shortness of breath  Cardiovascular: Negative for chest pain and palpitations  Gastrointestinal: Negative for abdominal pain, constipation, diarrhea and nausea     Endocrine: Negative for cold intolerance and heat intolerance  Hands are starting to get cold now within the last week  Musculoskeletal: Negative for arthralgias and back pain  Still some knee pain at times  Skin: Negative for rash  No dry skin, brittle nails, or hair loss  Neurological: Negative for tremors and headaches  Psychiatric/Behavioral: Negative for dysphoric mood and sleep disturbance  The patient is not nervous/anxious  Historical Information   Past Medical History:   Diagnosis Date    Atrial fibrillation ([address] )     Hypertension     with hyperthyroidism    Osteoarthritis      Past Surgical History:   Procedure Laterality Date    TUBAL LIGATION Bilateral      Social History   History   Alcohol Use No     History   Drug Use No     History   Smoking Status    Current Every Day Smoker    Packs/day: 0 50    Years: 0 00    Types: Cigarettes   Smokeless Tobacco    Never Used     Family History:   Family History   Problem Relation Age of Onset    Thyroid disease unspecified Mother     No Known Problems Father     Hypothyroidism Sister     Hashimoto's thyroiditis Sister     Goiter Sister     No Known Problems Brother     No Known Problems Sister     No Known Problems Sister     No Known Problems Brother     No Known Problems Brother     No Known Problems Son     No Known Problems Daughter        Meds/Allergies   Current Outpatient Prescriptions   Medication Sig Dispense Refill    b complex vitamins tablet Take 1 tablet by mouth daily      lisinopril (ZESTRIL) 2 5 mg tablet Take by mouth    4    methimazole (TAPAZOLE) 5 mg tablet Take 0 5 tablets (2 5 mg total) by mouth daily 90 tablet 2    metoprolol succinate (TOPROL-XL) 25 mg 24 hr tablet Take by mouth daily    4    XARELTO 20 MG tablet Take 20 mg by mouth daily with breakfast    4     No current facility-administered medications for this visit        Allergies   Allergen Reactions    Sulfamethoxazole-Trimethoprim        Objective   Vitals: Blood pressure 118/76, pulse 88, height 5' 4 57" (1 64 m), weight 66 3 kg (146 lb 3 2 oz)  Invasive Devices          No matching active lines, drains, or airways          Physical Exam   Constitutional: She is oriented to person, place, and time  She appears well-developed and well-nourished  HENT:   Head: Normocephalic and atraumatic  Eyes: Conjunctivae and EOM are normal  Pupils are equal, round, and reactive to light  No lid lag , stare, proptosis, or periorbital edema  Neck: Normal range of motion  Neck supple  No thyromegaly present  No carotid bruits  Cardiovascular: Normal rate, regular rhythm, normal heart sounds and intact distal pulses  No murmur heard  Pulmonary/Chest: Effort normal and breath sounds normal  She has no wheezes  Musculoskeletal: Normal range of motion  She exhibits no edema or deformity  No tremor of the outstretched hands  Lymphadenopathy:     She has no cervical adenopathy  Neurological: She is alert and oriented to person, place, and time  She has normal reflexes  Skin: Skin is warm and dry  No rash noted  Vitals reviewed  The history was obtained from the review of the chart and from the patient      Lab Results:          Lab Results   Component Value Date    FREET4 1 59 04/17/2018       Recent Results (from the past 15713 hour(s))   T4, free    Collection Time: 04/17/18  9:21 AM   Result Value Ref Range    Free t4 1 59 0 82 - 1 77 ng/dL   TSH, 3rd generation    Collection Time: 04/17/18  9:21 AM   Result Value Ref Range    TSH 0 028 (L) 0 450 - 4 500 uIU/mL   Thyroid stimulating immunoglobulin    Collection Time: 04/17/18  9:21 AM   Result Value Ref Range    TSI 1 96 (H) 0 00 - 0 55 IU/L   T3, free    Collection Time: 04/17/18  9:21 AM   Result Value Ref Range    Free T3 4 3 2 0 - 4 4 pg/mL   TSH, 3rd generation    Collection Time: 07/24/18  9:18 AM   Result Value Ref Range    TSH 1 220 0 450 - 4 500 uIU/mL   T4    Collection Time: 07/24/18  9:18 AM   Result Value Ref Range    T4, Total 7 5 4 5 - 12 0 ug/dL   T3, free    Collection Time: 07/24/18  9:18 AM   Result Value Ref Range    Free T3 3 3 2 0 - 4 4 pg/mL         Future Appointments  Date Time Provider Parisa Taveras   11/7/2018 9:40 AM Anamaria Gordon MD [facility]

## 2018-08-01 NOTE — PATIENT INSTRUCTIONS
Thyroid blood work is normal    Let's continue the same Methimazole 5 mg 1/2 tablet daily  Follow up in 3 months with blood work

## 2018-11-07 ENCOUNTER — OFFICE VISIT (OUTPATIENT)
Dept: ENDOCRINOLOGY | Facility: HOSPITAL | Age: 68
End: 2018-11-07
Payer: COMMERCIAL

## 2018-11-07 VITALS
BODY MASS INDEX: 24.46 KG/M2 | DIASTOLIC BLOOD PRESSURE: 70 MMHG | WEIGHT: 146.8 LBS | SYSTOLIC BLOOD PRESSURE: 102 MMHG | HEART RATE: 82 BPM | HEIGHT: 65 IN

## 2018-11-07 DIAGNOSIS — E05.00 GRAVES DISEASE: ICD-10-CM

## 2018-11-07 DIAGNOSIS — E05.90 HYPERTHYROIDISM: Primary | ICD-10-CM

## 2018-11-07 PROCEDURE — 99213 OFFICE O/P EST LOW 20 MIN: CPT | Performed by: INTERNAL MEDICINE

## 2018-11-07 NOTE — PROGRESS NOTES
11/7/2018    Assessment/Plan      Diagnoses and all orders for this visit:    Hyperthyroidism  -     TSH, 3rd generation Lab Collect; Future  -     T4, free Lab Collect; Future  -     Comprehensive metabolic panel Lab Collect; Future  -     CBC and differential Lab Collect; Future  -     T3, free; Future    Graves disease  -     TSH, 3rd generation Lab Collect; Future  -     T4, free Lab Collect; Future  -     Comprehensive metabolic panel Lab Collect; Future  -     CBC and differential Lab Collect; Future  -     T3, free; Future        Assessment/Plan:  Hyperthyroidism due to Graves disease  Most recent thyroid function tests are normal  For now, I will continue the same methimazole 5 mg a half a tablet or 2 5 mg daily  Last winter, we try discontinuing her methimazole and TSH started to suppress again so she is now just on a low dose of methimazole long-term  I have asked her to follow up in 4 months with preceding TSH, free T4, free T3, CMP, and CBC  CC: Hyperthyroid Follow up    History of Present Illness     HPI: Nnamdi Walls is a 77y o  year old female with history of hyperthyroidism due to Graves disease  She takes methimazole 5 mg 1/2 a tablet or 2 5 mg daily  Last winter her TSH had elevated and her methimazole was discontinued  Her TSH then started to suppress again and methimazole was restarted at low dose  She denies heat or cold intolerance, diarrhea or constipation, palpitation, tremors, anxiety or depression, fatigue, or weight changes  She does have some troubles of with variable sleep habits and waking up a lot  She denies dry skin, brittle nails, or hair loss  She reportedly is still in atrial fibrillation but does not notice it  She has no diplopia  She has no compressive thyroid symptoms or difficulties with swallowing  Had some recent stress with gentleman she lives with, taking him to hospital and he had cardiac arrest multiple times   He had 3 seizures and a heart attack  Review of Systems   Constitutional: Negative for fatigue and unexpected weight change  HENT: Negative for trouble swallowing  Eyes: Negative for visual disturbance  No diplopia  Respiratory: Negative for chest tightness and shortness of breath  Cardiovascular: Negative for chest pain, palpitations and leg swelling  Still in chronic a fib per cardiologist in September 2018, now on yearly cardiology visits  Gastrointestinal: Negative for abdominal pain, constipation, diarrhea and nausea  Endocrine: Negative for cold intolerance and heat intolerance  Musculoskeletal: Negative for arthralgias and back pain  Skin: Negative for rash  No dry skin except with weather change  No brittle nails or hair loss  Neurological: Negative for dizziness, tremors, light-headedness and headaches  Psychiatric/Behavioral: Positive for sleep disturbance  Negative for dysphoric mood  The patient is not nervous/anxious  Sleep is variable         Historical Information   Past Medical History:   Diagnosis Date    Atrial fibrillation ([address] )     Hypertension     with hyperthyroidism    Osteoarthritis      Past Surgical History:   Procedure Laterality Date    TUBAL LIGATION Bilateral      Social History   History   Alcohol Use No     History   Drug Use No     History   Smoking Status    Current Every Day Smoker    Packs/day: 0 50    Years: 0 00    Types: Cigarettes   Smokeless Tobacco    Never Used     Family History:   Family History   Problem Relation Age of Onset    Thyroid disease unspecified Mother     No Known Problems Father     Hypothyroidism Sister     Hashimoto's thyroiditis Sister    Steve Pert Goiter Sister     No Known Problems Brother     No Known Problems Sister     No Known Problems Sister     No Known Problems Brother     No Known Problems Brother     No Known Problems Son     No Known Problems Daughter        Meds/Allergies   Current Outpatient Prescriptions Medication Sig Dispense Refill    b complex vitamins tablet Take 1 tablet by mouth daily      lisinopril (ZESTRIL) 2 5 mg tablet Take by mouth    4    methimazole (TAPAZOLE) 5 mg tablet Take 0 5 tablets (2 5 mg total) by mouth daily 90 tablet 2    metoprolol succinate (TOPROL-XL) 25 mg 24 hr tablet Take by mouth daily    4    XARELTO 20 MG tablet Take 20 mg by mouth daily with breakfast    4     No current facility-administered medications for this visit  Allergies   Allergen Reactions    Sulfamethoxazole-Trimethoprim        Objective   Vitals: Blood pressure 102/70, pulse 82, height 5' 4 57" (1 64 m), weight 66 6 kg (146 lb 12 8 oz)  Invasive Devices          No matching active lines, drains, or airways          Physical Exam   Constitutional: She is oriented to person, place, and time  She appears well-developed and well-nourished  HENT:   Head: Normocephalic and atraumatic  Eyes: Pupils are equal, round, and reactive to light  Conjunctivae and EOM are normal    No lid lag, stare, proptosis, or periorbital edema  Neck: Normal range of motion  Neck supple  No thyromegaly present  No bruits over the thyroid gland or carotids  Thyroid not enlarged  No palpable thyroid nodules but thyroid was somewhat irregular in feel  Cardiovascular: Normal rate, regular rhythm and normal heart sounds  No murmur heard  Pulmonary/Chest: Effort normal and breath sounds normal  She has no wheezes  Abdominal: Soft  Bowel sounds are normal  There is no tenderness  Musculoskeletal: Normal range of motion  She exhibits no edema or deformity  No tremor of the outstretched hands  Lymphadenopathy:     She has no cervical adenopathy  Neurological: She is alert and oriented to person, place, and time  She has normal reflexes  Skin: Skin is warm and dry  No rash noted  Vitals reviewed  The history was obtained from the review of the chart and from the patient      Lab Results:    Blood work done at [facility] on 10/17/2018 showed a TSH of 2 83 with a free T4 of 1 23 and free T3 of 3        Future Appointments  Date Time Provider Parisa Taveras   3/7/2019 8:40 AM Dot Clarke MD [facility]

## 2018-11-07 NOTE — PATIENT INSTRUCTIONS
Thyroid blood work is normal   Let's continue the low dose of thyroid medicine methimazole 5 mg 1/2 pill daily  Follow up in 4 months with blood work

## 2019-03-07 ENCOUNTER — OFFICE VISIT (OUTPATIENT)
Dept: ENDOCRINOLOGY | Facility: HOSPITAL | Age: 69
End: 2019-03-07
Payer: COMMERCIAL

## 2019-03-07 VITALS
SYSTOLIC BLOOD PRESSURE: 112 MMHG | BODY MASS INDEX: 24.62 KG/M2 | HEART RATE: 84 BPM | HEIGHT: 65 IN | WEIGHT: 147.8 LBS | DIASTOLIC BLOOD PRESSURE: 70 MMHG

## 2019-03-07 DIAGNOSIS — E05.00 GRAVES DISEASE: ICD-10-CM

## 2019-03-07 DIAGNOSIS — E05.90 HYPERTHYROIDISM: Primary | ICD-10-CM

## 2019-03-07 PROCEDURE — 99213 OFFICE O/P EST LOW 20 MIN: CPT | Performed by: INTERNAL MEDICINE

## 2019-03-07 RX ORDER — METHIMAZOLE 5 MG/1
2.5 TABLET ORAL EVERY OTHER DAY
Qty: 90 TABLET | Refills: 2 | Status: SHIPPED | OUTPATIENT
Start: 2019-03-07 | End: 2020-05-07

## 2019-03-07 NOTE — PATIENT INSTRUCTIONS
Thyroid blood work is a hair on the underactive Side now  Decrease the methimazole top 5 mg 1/2 tablet every other day  Follow up in 3 months with blood work

## 2019-03-07 NOTE — PROGRESS NOTES
3/7/2019    Assessment/Plan      Diagnoses and all orders for this visit:    Hyperthyroidism  -     T4, free Lab Collect; Future  -     TSH, 3rd generation Lab Collect; Future  -     T3, free; Future  -     methimazole (TAPAZOLE) 5 mg tablet; Take 0 5 tablets (2 5 mg total) by mouth every other day    Graves disease  -     T4, free Lab Collect; Future  -     TSH, 3rd generation Lab Collect; Future  -     T3, free; Future  -     methimazole (TAPAZOLE) 5 mg tablet; Take 0 5 tablets (2 5 mg total) by mouth every other day        Assessment/Plan:  1  Hyperthyroidism due to Graves disease  Her most recent thyroid function tests do show that she is slightly hypothyroid  I will slightly decrease her methimazole to 5 mg half a tablet or 2 5 mg every other day  I would like to get her off methimazole in the future again and see if she is in remission, but I would like to wait another 6 months of possible  2  She has a history of Graves disease and has no evidence of Graves ophthalmopathy  I have asked her to follow up in 3 months with preceding TSH, free T4, and free T3       CC:  Hyperthyroid follow-up    History of Present Illness     HPI: Nnamdi Walls is a 77y o  year old female with history of hyperthyroidism due to Graves disease  Her Graves disease was diagnosed in October 2016 when she was hospitalized for atrial fibrillation  In February 2018, methimazole was discontinued with the hope that she was in remission  She unfortunately was not and started to developed hyperthyroidism again  Methimazole was restarted in late April 2018  She is taking methimazole 5 mg a half a tablet or 2 5 mg daily since then  She denies heat or cold intolerance, diarrhea or constipation, palpitation, tremors, anxiety or depression  She denies insomnia or fatigue  Weight has been stable  She denies dry skin, brittle nails, or hair loss  She has no diplopia    She has no compressive thyroid symptoms or difficulties with swallowing  Review of Systems   Constitutional: Negative for fatigue and unexpected weight change  HENT: Negative for trouble swallowing  Eyes: Negative for visual disturbance  No diplopia  No recent eye doctor visit  Respiratory: Negative for chest tightness and shortness of breath  Cardiovascular: Negative for chest pain and palpitations  Saw cardiologist in fall 2018 and a fib is stable  Still in a fib  Gastrointestinal: Negative for abdominal pain, constipation, diarrhea and nausea  Endocrine: Negative for cold intolerance and heat intolerance  Musculoskeletal: Negative for arthralgias  Skin: Negative for rash  No dry skin, brittle nails, or hair loss  Neurological: Negative for dizziness, tremors, light-headedness and headaches  Psychiatric/Behavioral: Negative for dysphoric mood and sleep disturbance  The patient is not nervous/anxious          Historical Information   Past Medical History:   Diagnosis Date    Atrial fibrillation ([address] )     Hypertension     with hyperthyroidism    Osteoarthritis      Past Surgical History:   Procedure Laterality Date    TUBAL LIGATION Bilateral      Social History   Social History     Substance and Sexual Activity   Alcohol Use No     Social History     Substance and Sexual Activity   Drug Use No     Social History     Tobacco Use   Smoking Status Current Every Day Smoker    Packs/day: 0 50    Years: 0 00    Pack years: 0 00    Types: Cigarettes   Smokeless Tobacco Never Used     Family History:   Family History   Problem Relation Age of Onset    Thyroid disease unspecified Mother     No Known Problems Father     Hypothyroidism Sister     Hashimoto's thyroiditis Sister    Andres Lamprey Goiter Sister     No Known Problems Brother     No Known Problems Sister     No Known Problems Sister     No Known Problems Brother     No Known Problems Brother     No Known Problems Son     No Known Problems Daughter        Meds/Allergies Current Outpatient Medications   Medication Sig Dispense Refill    b complex vitamins tablet Take 1 tablet by mouth daily      lisinopril (ZESTRIL) 2 5 mg tablet Take by mouth    4    methimazole (TAPAZOLE) 5 mg tablet Take 0 5 tablets (2 5 mg total) by mouth every other day 90 tablet 2    metoprolol succinate (TOPROL-XL) 25 mg 24 hr tablet Take by mouth daily    4    XARELTO 20 MG tablet Take 20 mg by mouth daily with breakfast    4     No current facility-administered medications for this visit  Allergies   Allergen Reactions    Sulfamethoxazole-Trimethoprim        Objective   Vitals: Blood pressure 112/70, pulse 84, height 5' 4 57" (1 64 m), weight 67 kg (147 lb 12 8 oz)  Invasive Devices          None          Physical Exam   Constitutional: She is oriented to person, place, and time  She appears well-developed and well-nourished  HENT:   Head: Normocephalic and atraumatic  Eyes: Pupils are equal, round, and reactive to light  Conjunctivae and EOM are normal    No lid lag, stare, proptosis, or periorbital edema  Neck: Normal range of motion  Neck supple  No thyromegaly present  Thyroid normal in size without palpable thyroid nodules  No bruits over the thyroid gland  Cardiovascular: Normal rate, regular rhythm and normal heart sounds  No murmur heard  Pulmonary/Chest: Effort normal and breath sounds normal  She has no wheezes  Abdominal: Soft  There is no tenderness  Musculoskeletal: Normal range of motion  She exhibits no edema or deformity  No tremor of the outstretched hands  Lymphadenopathy:     She has no cervical adenopathy  Neurological: She is alert and oriented to person, place, and time  She has normal reflexes  Deep tendon reflexes normal without briskness  Skin: Skin is warm and dry  No rash noted  Vitals reviewed  The history was obtained from the review of the chart and from the patient      Lab Results:   Blood work done at [address] [place] [address] on 02/15/2019 showed a TSH of 5 58 with a free T4 1 15 and a free T3 of 2 4    CMP was normal and CBC was normal       Future Appointments   Date Time Provider Parisa Taveras   6/13/2019  9:00 AM Marvin Mathis MD [facility]

## 2019-03-07 NOTE — PROGRESS NOTES
I received a consult note from endocrinology on this patient, I am listed as her PCP but she has never seen me, she has only seen Dr Wilbur Mendoza  If she is going to be seeing us for primary care she needs an appointment with labs and a physical   If not please change her primary back to Wilbur Mendoza

## 2019-03-07 NOTE — LETTER
March 7, 2019     Luanne Whelan MD  [id number]    Patient: Tutu Dong   YOB: 1950   Date of Visit: 3/7/2019       Dear Dr Elma Vera:    Thank you for referring Jaynerupesh Palma to me for evaluation  Below are my notes for this consultation  If you have questions, please do not hesitate to call me  I look forward to following your patient along with you  Sincerely,        Tran Long MD        CC: No Recipients  Tran Long MD  3/7/2019  3:51 PM  Sign at close encounter  3/7/2019    Assessment/Plan      Diagnoses and all orders for this visit:    Hyperthyroidism  -     T4, free Lab Collect; Future  -     TSH, 3rd generation Lab Collect; Future  -     T3, free; Future  -     methimazole (TAPAZOLE) 5 mg tablet; Take 0 5 tablets (2 5 mg total) by mouth every other day    Graves disease  -     T4, free Lab Collect; Future  -     TSH, 3rd generation Lab Collect; Future  -     T3, free; Future  -     methimazole (TAPAZOLE) 5 mg tablet; Take 0 5 tablets (2 5 mg total) by mouth every other day        Assessment/Plan:  1  Hyperthyroidism due to Graves disease  Her most recent thyroid function tests do show that she is slightly hypothyroid  I will slightly decrease her methimazole to 5 mg half a tablet or 2 5 mg every other day  I would like to get her off methimazole in the future again and see if she is in remission, but I would like to wait another 6 months of possible  2  She has a history of Graves disease and has no evidence of Graves ophthalmopathy  I have asked her to follow up in 3 months with preceding TSH, free T4, and free T3       CC:  Hyperthyroid follow-up    History of Present Illness     HPI: Tutu Dong is a 77y o  year old female with history of hyperthyroidism due to Graves disease  Her Graves disease was diagnosed in October 2016 when she was hospitalized for atrial fibrillation    In February 2018, methimazole was discontinued with the hope that she was in remission  She unfortunately was not and started to developed hyperthyroidism again  Methimazole was restarted in late April 2018  She is taking methimazole 5 mg a half a tablet or 2 5 mg daily since then  She denies heat or cold intolerance, diarrhea or constipation, palpitation, tremors, anxiety or depression  She denies insomnia or fatigue  Weight has been stable  She denies dry skin, brittle nails, or hair loss  She has no diplopia  She has no compressive thyroid symptoms or difficulties with swallowing  Review of Systems   Constitutional: Negative for fatigue and unexpected weight change  HENT: Negative for trouble swallowing  Eyes: Negative for visual disturbance  No diplopia  No recent eye doctor visit  Respiratory: Negative for chest tightness and shortness of breath  Cardiovascular: Negative for chest pain and palpitations  Saw cardiologist in fall 2018 and a fib is stable  Still in a fib  Gastrointestinal: Negative for abdominal pain, constipation, diarrhea and nausea  Endocrine: Negative for cold intolerance and heat intolerance  Musculoskeletal: Negative for arthralgias  Skin: Negative for rash  No dry skin, brittle nails, or hair loss  Neurological: Negative for dizziness, tremors, light-headedness and headaches  Psychiatric/Behavioral: Negative for dysphoric mood and sleep disturbance  The patient is not nervous/anxious          Historical Information   Past Medical History:   Diagnosis Date    Atrial fibrillation ([address] )     Hypertension     with hyperthyroidism    Osteoarthritis      Past Surgical History:   Procedure Laterality Date    TUBAL LIGATION Bilateral      Social History   Social History     Substance and Sexual Activity   Alcohol Use No     Social History     Substance and Sexual Activity   Drug Use No     Social History     Tobacco Use   Smoking Status Current Every Day Smoker    Packs/day: 0 50    Years: 0 00    Pack years: 0 00    Types: Cigarettes   Smokeless Tobacco Never Used     Family History:   Family History   Problem Relation Age of Onset    Thyroid disease unspecified Mother     No Known Problems Father     Hypothyroidism Sister     Hashimoto's thyroiditis Sister     Goiter Sister     No Known Problems Brother     No Known Problems Sister     No Known Problems Sister     No Known Problems Brother     No Known Problems Brother     No Known Problems Son     No Known Problems Daughter        Meds/Allergies   Current Outpatient Medications   Medication Sig Dispense Refill    b complex vitamins tablet Take 1 tablet by mouth daily      lisinopril (ZESTRIL) 2 5 mg tablet Take by mouth    4    methimazole (TAPAZOLE) 5 mg tablet Take 0 5 tablets (2 5 mg total) by mouth every other day 90 tablet 2    metoprolol succinate (TOPROL-XL) 25 mg 24 hr tablet Take by mouth daily    4    XARELTO 20 MG tablet Take 20 mg by mouth daily with breakfast    4     No current facility-administered medications for this visit  Allergies   Allergen Reactions    Sulfamethoxazole-Trimethoprim        Objective   Vitals: Blood pressure 112/70, pulse 84, height 5' 4 57" (1 64 m), weight 67 kg (147 lb 12 8 oz)  Invasive Devices          None          Physical Exam   Constitutional: She is oriented to person, place, and time  She appears well-developed and well-nourished  HENT:   Head: Normocephalic and atraumatic  Eyes: Pupils are equal, round, and reactive to light  Conjunctivae and EOM are normal    No lid lag, stare, proptosis, or periorbital edema  Neck: Normal range of motion  Neck supple  No thyromegaly present  Thyroid normal in size without palpable thyroid nodules  No bruits over the thyroid gland  Cardiovascular: Normal rate, regular rhythm and normal heart sounds  No murmur heard  Pulmonary/Chest: Effort normal and breath sounds normal  She has no wheezes  Abdominal: Soft  There is no tenderness  Musculoskeletal: Normal range of motion  She exhibits no edema or deformity  No tremor of the outstretched hands  Lymphadenopathy:     She has no cervical adenopathy  Neurological: She is alert and oriented to person, place, and time  She has normal reflexes  Deep tendon reflexes normal without briskness  Skin: Skin is warm and dry  No rash noted  Vitals reviewed  The history was obtained from the review of the chart and from the patient  Lab Results:   Blood work done at [facility] on 02/15/2019 showed a TSH of 5 58 with a free T4 1 15 and a free T3 of 2 4    CMP was normal and CBC was normal       Future Appointments   Date Time Provider Parisa Taveras   6/13/2019  9:00 AM Jackie Renee MD [facility]

## 2019-03-08 NOTE — PROGRESS NOTES
The pt needs an AWV - please send her the paperwork and schedule her  If she has not had a full set of labs in the past year she needs labs too

## 2019-03-12 ENCOUNTER — OFFICE VISIT (OUTPATIENT)
Dept: FAMILY MEDICINE CLINIC | Facility: CLINIC | Age: 69
End: 2019-03-12
Payer: COMMERCIAL

## 2019-03-12 VITALS
DIASTOLIC BLOOD PRESSURE: 70 MMHG | WEIGHT: 146.38 LBS | HEART RATE: 80 BPM | BODY MASS INDEX: 24.39 KG/M2 | TEMPERATURE: 98.8 F | HEIGHT: 65 IN | SYSTOLIC BLOOD PRESSURE: 112 MMHG | OXYGEN SATURATION: 98 %

## 2019-03-12 DIAGNOSIS — Z12.39 SCREENING FOR BREAST CANCER: ICD-10-CM

## 2019-03-12 DIAGNOSIS — Z12.11 SCREENING FOR COLON CANCER: ICD-10-CM

## 2019-03-12 DIAGNOSIS — Z00.00 ENCOUNTER FOR MEDICARE ANNUAL WELLNESS EXAM: Primary | ICD-10-CM

## 2019-03-12 DIAGNOSIS — Z13.220 LIPID SCREENING: ICD-10-CM

## 2019-03-12 DIAGNOSIS — Z11.59 NEED FOR HEPATITIS C SCREENING TEST: ICD-10-CM

## 2019-03-12 PROCEDURE — 1160F RVW MEDS BY RX/DR IN RCRD: CPT | Performed by: FAMILY MEDICINE

## 2019-03-12 PROCEDURE — 1170F FXNL STATUS ASSESSED: CPT | Performed by: FAMILY MEDICINE

## 2019-03-12 PROCEDURE — 1125F AMNT PAIN NOTED PAIN PRSNT: CPT | Performed by: FAMILY MEDICINE

## 2019-03-12 PROCEDURE — G0438 PPPS, INITIAL VISIT: HCPCS | Performed by: FAMILY MEDICINE

## 2019-03-12 PROCEDURE — 3008F BODY MASS INDEX DOCD: CPT | Performed by: FAMILY MEDICINE

## 2019-03-12 NOTE — PROGRESS NOTES
Assessment and Plan:    The patient is new to the office, used to be seen by Dr Izzy Marte  She has not been seen by a PCP in years  She follows with her endocrinologist and cardiologist regularly  Other than blood work she does not do any screening, she declines everything  She does not do any immunizations  Problem List Items Addressed This Visit     None      Visit Diagnoses     Encounter for Medicare annual wellness exam    -  Primary    Screening for breast cancer        Relevant Orders    Mammo screening bilateral w 3d & cad    Screening for colon cancer        Relevant Orders    Ambulatory referral to Gastroenterology    Occult Blood, Fecal, IA    Need for hepatitis C screening test        Relevant Orders    Hepatitis C antibody    Lipid screening        Relevant Orders    Lipid panel        Health Maintenance Due   Topic Date Due    Hepatitis C Screening  1950    Medicare Annual Wellness Visit (AWV)  1950    MAMMOGRAM  1950    CRC Screening: Colonoscopy  1950         HPI:  Jose Alexander is a 77 y o  female here for her Initial Wellness Visit      Patient Active Problem List   Diagnosis    Hyperthyroidism    Graves disease    Atrial fibrillation (HCC)     Past Medical History:   Diagnosis Date    Atrial fibrillation ([facility] [address] )     Hypertension     with hyperthyroidism    Osteoarthritis      Past Surgical History:   Procedure Laterality Date    TUBAL LIGATION Bilateral      Family History   Problem Relation Age of Onset    Thyroid disease unspecified Mother     No Known Problems Father     Hypothyroidism Sister     Hashimoto's thyroiditis Sister     Goiter Sister     No Known Problems Brother     No Known Problems Sister     No Known Problems Sister     No Known Problems Brother     No Known Problems Brother     No Known Problems Son     No Known Problems Daughter      Social History     Tobacco Use   Smoking Status Current Every Day Smoker    Packs/day: 0 50  Years: 0 00    Pack years: 0 00    Types: Cigarettes   Smokeless Tobacco Never Used     Social History     Substance and Sexual Activity   Alcohol Use No      Social History     Substance and Sexual Activity   Drug Use No       Current Outpatient Medications   Medication Sig Dispense Refill    b complex vitamins tablet Take 1 tablet by mouth daily      lisinopril (ZESTRIL) 2 5 mg tablet Take by mouth    4    methimazole (TAPAZOLE) 5 mg tablet Take 0 5 tablets (2 5 mg total) by mouth every other day 90 tablet 2    metoprolol succinate (TOPROL-XL) 25 mg 24 hr tablet Take by mouth daily    4    XARELTO 20 MG tablet Take 20 mg by mouth daily with breakfast    4     No current facility-administered medications for this visit  Allergies   Allergen Reactions    Sulfamethoxazole-Trimethoprim        There is no immunization history on file for this patient  Patient Care Team:  Mainor Skinner MD as PCP - General (Family Medicine)  Cortez Caceres MD as PCP - Endocrinology (Endocrinology)  Kim Bennett MD (Cardiology)    Medicare Screening Tests and Risk Assessments: Love Handley is here for her Initial Wellness visit  Health Risk Assessment:  Patient rates overall health as very good  Patient feels that their physical health rating is Much better  Eyesight was rated as Same  Hearing was rated as Same  Patient feels that their emotional and mental health rating is Same  Pain experienced by patient in the last 7 days has been Some  Patient's pain rating has been 3/10  Patient states that she has experienced no weight loss or gain in last 6 months  Emotional/Mental Health:  Patient has been feeling nervous/anxious  PHQ-9 Depression Screening:    Frequency of the following problems over the past two weeks:      1  Little interest or pleasure in doing things: 0 - not at all      2  Feeling down, depressed, or hopeless: 0 - not at all  PHQ-2 Score: 0          Broken Bones/Falls:     Fall Risk Assessment:    In the past year, patient has experienced: No history of falling in past year          Bladder/Bowel:  Patient has not leaked urine accidently in the last six months  Patient reports no loss of bowel control  Immunizations:  Patient has not had a flu vaccination within the last year  Patient has not received a pneumonia shot  Patient has not received a shingles shot  Patient has not received tetanus/diphtheria shot  Home Safety:  Patient does not have trouble with stairs inside or outside of their home  Patient currently reports that there are no safety hazards present in home, working smoke alarms, working carbon monoxide detectors  Preventative Screenings:   No breast cancer screening performed, no colon cancer screen completed, no cholesterol screen completed, no glaucoma eye exam completed    Nutrition:  Current diet: Regular and No Added Salt with servings of the following:    Medications:  Patient is not currently taking any over-the-counter supplements  Patient is able to manage medications  Lifestyle Choices:  Patient reports current tobacco use  Patient reports no alcohol use  Patient drives a vehicle  Patient wears seat belt  Current level of exercise of physical activity described by patient as: walking  Activities of Daily Living:  Can get out of bed by his or her self, able to dress self, able to make own meals, able to do own shopping, able to bathe self, can do own laundry/housekeeping, can manage own money, pay bills and track expenses    Previous Hospitalizations:  No hospitalization or ED visit in past 12 months        Advanced Directives:  Patient has decided on a power of   Patient has spoken to designated power of   Patient has not completed advanced directive          Preventative Screening/Counseling:      Cardiovascular:      General: Risks and Benefits Discussed     Due for Labs/Analytes/Optional EKG: Lipid Panel      Comments: She has thyroid labs ordered from her endocrinologist to be done in three months at which point the lipid panel can be drawn as well  Diabetes:      General: Risks and Benefits Discussed and Screening Current          Colorectal Cancer:      General: Risks and Benefits Discussed and Patient Declines      Comments: The patient has never had any kind of colon cancer screening and does not want any  She has no family history of colon cancer  We did discuss that there are other ways to screen for colon cancer other than colonoscopy including the fit kit  I did provide her with a kit as well as an order and strongly encouraged that she consider this  We discussed why we screen for colon cancer  Breast Cancer:      General: Risks and Benefits Discussed and Patient Declines      Comments: The patient has never had a mammogram and does not want one  She has no family history of breast cancer  I strongly encouraged that she consider having at least one mammogram         Cervical Cancer:      General: Screening Not Indicated          Osteoporosis:      General: Patient Declines and Risks and Benefits Discussed      Counseling: Calcium and Vitamin D Intake and Regular Weightbearing Exercise          AAA:      General: Screening Not Indicated          Glaucoma:      General: Risks and Benefits Discussed and Patient Declines      Referrals: Ophthalmology      Comments: The patient has never seen an eye doctor  I did encourage that she see want to be screened for glaucoma  HIV:      General: Screening Not Indicated          Hepatitis C:      General: Risks and Benefits Discussed      Counseling: has received general HCV counseling      Additional Comments: This will be drawn when she returns for her blood work in three months  Advanced Directives:   Patient has no living will for healthcare, does not have durable POA for healthcare, patient does not have an advanced directive   Information on ACP and/or AD provided  5 wishes given       Immunizations:      Influenza: Patient Declines      Pneumococcal: Patient Declines      Shingrix: Patient Declines      TDAP: Patient Declines

## 2019-03-12 NOTE — PATIENT INSTRUCTIONS
Colonoscopy   AMBULATORY CARE:   What you need to know about a colonoscopy:  A colonoscopy is a procedure to examine the inside of your colon (intestine) with a scope  A scope is a flexible tube with a small light and camera on the end  Polyps or tissue growths may be removed during your colonoscopy  What you need to do the week before your colonoscopy: You will need to stop taking medicines that contain aspirin or iron for 7 days before your colonoscopy  If you take anticoagulants, such as warfarin, ask when you should stop taking it  Make plans for someone to drive you home after your procedure  How to prepare for your colonoscopy: Your healthcare provider will have you prepare your bowels before your procedure  Your bowels will need to be empty before your procedure to allow him to clearly see your colon  You will need to do the following the day before your procedure:  · Have only clear liquids  for the entire day before your colonoscopy  Clear liquid diet includes clear fruit juices and broths, clear flavored gelatin, and hard candy  It also includes coffee, tea, carbonated beverages, and clear sports drinks  · Follow your bowel prep as directed  There are many different preparations that can be given before a colonoscopy  Some are given over 2 hours and others over 6 hours  Some are given earlier in the afternoon the day before the colonoscopy  Others are given the day before and then the morning of the colonoscopy  With any bowel prep, stay close to the bathroom  This liquid will cause your bowels to move frequently  · An enema  may be needed  Your healthcare provider may tell you to use an enema to help clean out your bowels  · Do not eat or drink anything after midnight  This will help prevent problems that can happen if you vomit while under anesthesia  What will happen during your colonoscopy:   · You will be given medicine to help you relax   You will lie on your left side and raise one or both knees toward your chest  Your healthcare provider will examine your anus and use a finger to check your rectum  You may need another enema if your bowel is not empty  The scope will be lubricated and gently placed into your anus  It will then be passed through your rectum and into your colon  Water or air will be put into your colon to help clean or expand it  This is done so your healthcare provider can see your colon clearly  · Tissue samples may be taken from the walls of your bowel and sent to a lab for tests  If you have a polyp, your healthcare provider will pass a wire loop through the scope and use it to hold the polyp  The polyp is then burned or cut off the wall of your colon  Removed polyps are sent to a lab for tests  Pictures of your colon may be taken during the procedure  The scope will be removed when the procedure is done  What will happen after your colonoscopy:   · Rest after your procedure  You may feel bloated, have some gas and abdominal discomfort  You may need to lie on your right side with a heating pad on your abdomen  You may need to take short walks to help move the gas out  Eat small meals, if you feel bloated  Do not drive or make important decisions until the day after your procedure  · You may have polyps removed  Do not take aspirin or go on long car trips for 7 days after your procedure  Ask your healthcare provider about any other limits after your procedure  Risks of a colonoscopy: You may have pain or bleeding after the scope or polyps are removed  You may also have a slow heartbeat, decreased blood pressure, or increased sweating  Your colon may tear due to the increased pressure from the scope and other instruments  This may cause bowel contents to leak out of your colon and into your abdomen  If this happens, you will need to stay in the hospital and have surgery on your colon     Seek care immediately if:   · You have a large amount of bright red blood in your bowel movements  · Your abdomen is hard and firm and you have severe pain  · You have sudden trouble breathing  Contact your healthcare provider if:   · You develop a rash or hives  · You have a fever within 24 hours of your procedure  · You have not had a bowel movement for 3 days after your procedure  · You have questions or concerns about your condition or care  Activity:   · Do not lift, strain, or run  for 3 days after your procedure  · Rest after your procedure  You have been given medicine to relax you  Do not  drive or make important decisions until the day after your procedure  Return to your normal activity as directed  · Relieve gas and discomfort from bloating  by lying on your right side with a heating pad on your abdomen  You may need to take short walks to help the gas move out  Eat small meals until bloating is relieved  If you had polyps removed: For 7 days after your procedure:  · Do not  take aspirin  · Do not  go on long car rides  Help prevent constipation:   · Eat a variety of healthy foods  Healthy foods include fruit, vegetables, whole-grain breads, low-fat dairy products, beans, lean meat, and fish  Ask if you need to be on a special diet  Your healthcare provider may recommend that you eat high-fiber foods such as cooked beans  Fiber helps you have regular bowel movements  · Drink liquids as directed  Adults should drink between 9 and 13 eight-ounce cups of liquid every day  Ask what amount is best for you  For most people, good liquids to drink are water, juice, and milk  · Exercise as directed  Talk to your healthcare provider about the best exercise plan for you  Exercise can help prevent constipation, decrease your blood pressure and improve your health  Follow up with your healthcare provider as directed:  Write down your questions so you remember to ask them during your visits     © 2017 [facility] Information is for End User's use only and may not be sold, redistributed or otherwise used for commercial purposes  All illustrations and images included in CareNotes® are the copyrighted property of [employer]  or [employer]  The above information is an  only  It is not intended as medical advice for individual conditions or treatments  Talk to your doctor, nurse or pharmacist before following any medical regimen to see if it is safe and effective for you  Mammogram   WHAT YOU SHOULD KNOW:   A mammogram is an x-ray of your breasts to screen for breast cancer  INSTRUCTIONS:   Who should have a mammogram:  You should have a mammogram if you felt a lump or noticed other changes during a breast self-exam  Ask your primary healthcare provider when you should start having mammograms  How to get ready for a mammogram:   · Do not use deodorant, powder, lotion, or perfume  These products may cause particles to appear on your mammogram     · Wear a 2-piece outfit  · If you are breastfeeding, express as much milk as possible before the mammogram     · Bring a list of the dates and places of your past mammograms and other breast tests or treatments  · If your breasts are tender before your monthly period, do not have a mammogram during this time  Schedule your mammogram to be done 1 week after your period ends  How a mammogram is done:  A top view and a side view x-ray are usually done for each breast  Tell caregivers if you have breast implants or breast problems before you have your mammogram  You may need extra x-rays of each breast   · You will be given a hospital gown  Take off your clothes from the waist up  Wear the hospital gown so that it opens in the front  · You will sit or stand next to a small x-ray machine  Caregivers will help you place one of your breasts on the x-ray plate  Your arm and breast will be moved until your breast is in the correct position       · Your breast will be gently pressed between 2 plastic plates for a few seconds while the x-ray is taken  This may be uncomfortable  · You will be asked to hold your breath while the x-ray is taken  Another x-ray will be taken of the same breast after the position of the x-ray machine has been changed  · Your other breast will be x-rayed the same way  After your mammogram:  Your breasts may feel tender for a short while after the mammogram  You may resume your regular activities  Ask your primary healthcare provider when you should receive the results of your mammogram   Risks of a mammogram:  You will be exposed to a small amount of radiation  Some breast cancers may not show up on mammograms  Follow up with your healthcare provider as directed:  Write down your questions so you remember to ask them during your visits  Contact your primary healthcare provider if:   · You cannot make your appointment on time  · You do not receive your results when expected  · You have questions or concerns about the mammogram   © 2014 [address] is for End User's use only and may not be sold, redistributed or otherwise used for commercial purposes  All illustrations and images included in CareNotes® are the copyrighted property of [employer]  or [employer]  The above information is an  only  It is not intended as medical advice for individual conditions or treatments  Talk to your doctor, nurse or pharmacist before following any medical regimen to see if it is safe and effective for you

## 2019-05-23 ENCOUNTER — TELEPHONE (OUTPATIENT)
Dept: FAMILY MEDICINE CLINIC | Facility: CLINIC | Age: 69
End: 2019-05-23

## 2019-06-11 LAB
T3FREE SERPL-MCNC: 2.8 PG/ML (ref 2–4.4)
T4 FREE SERPL-MCNC: 1.29 NG/DL (ref 0.82–1.77)
TSH SERPL DL<=0.005 MIU/L-ACNC: 1.71 UIU/ML (ref 0.45–4.5)

## 2019-06-13 ENCOUNTER — OFFICE VISIT (OUTPATIENT)
Dept: ENDOCRINOLOGY | Facility: HOSPITAL | Age: 69
End: 2019-06-13
Payer: COMMERCIAL

## 2019-06-13 VITALS
WEIGHT: 145 LBS | DIASTOLIC BLOOD PRESSURE: 62 MMHG | BODY MASS INDEX: 24.16 KG/M2 | HEIGHT: 65 IN | SYSTOLIC BLOOD PRESSURE: 104 MMHG | HEART RATE: 74 BPM

## 2019-06-13 DIAGNOSIS — E05.00 GRAVES DISEASE: ICD-10-CM

## 2019-06-13 DIAGNOSIS — E05.90 HYPERTHYROIDISM: Primary | ICD-10-CM

## 2019-06-13 PROCEDURE — 99213 OFFICE O/P EST LOW 20 MIN: CPT | Performed by: INTERNAL MEDICINE

## 2019-12-30 LAB
ALBUMIN SERPL-MCNC: 4.5 G/DL (ref 3.5–4.8)
ALBUMIN/GLOB SERPL: 2.8 {RATIO} (ref 1.2–2.2)
ALP SERPL-CCNC: 81 IU/L (ref 39–117)
ALT SERPL-CCNC: 13 IU/L (ref 0–32)
AST SERPL-CCNC: 19 IU/L (ref 0–40)
BASOPHILS # BLD AUTO: 0 X10E3/UL (ref 0–0.2)
BASOPHILS NFR BLD AUTO: 1 %
BILIRUB SERPL-MCNC: 0.6 MG/DL (ref 0–1.2)
BUN SERPL-MCNC: 8 MG/DL (ref 8–27)
BUN/CREAT SERPL: 13 (ref 12–28)
CALCIUM SERPL-MCNC: 9 MG/DL (ref 8.7–10.3)
CHLORIDE SERPL-SCNC: 104 MMOL/L (ref 96–106)
CO2 SERPL-SCNC: 21 MMOL/L (ref 20–29)
CREAT SERPL-MCNC: 0.61 MG/DL (ref 0.57–1)
EOSINOPHIL # BLD AUTO: 0.1 X10E3/UL (ref 0–0.4)
EOSINOPHIL NFR BLD AUTO: 1 %
ERYTHROCYTE [DISTWIDTH] IN BLOOD BY AUTOMATED COUNT: 13.5 % (ref 12.3–15.4)
GLOBULIN SER-MCNC: 1.6 G/DL (ref 1.5–4.5)
GLUCOSE SERPL-MCNC: 107 MG/DL (ref 65–99)
HCT VFR BLD AUTO: 41.5 % (ref 34–46.6)
HGB BLD-MCNC: 14 G/DL (ref 11.1–15.9)
IMM GRANULOCYTES # BLD: 0 X10E3/UL (ref 0–0.1)
IMM GRANULOCYTES NFR BLD: 0 %
LYMPHOCYTES # BLD AUTO: 1.9 X10E3/UL (ref 0.7–3.1)
LYMPHOCYTES NFR BLD AUTO: 34 %
MCH RBC QN AUTO: 32 PG (ref 26.6–33)
MCHC RBC AUTO-ENTMCNC: 33.7 G/DL (ref 31.5–35.7)
MCV RBC AUTO: 95 FL (ref 79–97)
MONOCYTES # BLD AUTO: 0.4 X10E3/UL (ref 0.1–0.9)
MONOCYTES NFR BLD AUTO: 7 %
NEUTROPHILS # BLD AUTO: 3.2 X10E3/UL (ref 1.4–7)
NEUTROPHILS NFR BLD AUTO: 57 %
PLATELET # BLD AUTO: 206 X10E3/UL (ref 150–450)
POTASSIUM SERPL-SCNC: 4 MMOL/L (ref 3.5–5.2)
PROT SERPL-MCNC: 6.1 G/DL (ref 6–8.5)
RBC # BLD AUTO: 4.37 X10E6/UL (ref 3.77–5.28)
SL AMB EGFR AFRICAN AMERICAN: 105 ML/MIN/1.73
SL AMB EGFR NON AFRICAN AMERICAN: 92 ML/MIN/1.73
SODIUM SERPL-SCNC: 139 MMOL/L (ref 134–144)
T3FREE SERPL-MCNC: 2.6 PG/ML (ref 2–4.4)
T4 FREE SERPL-MCNC: 1.4 NG/DL (ref 0.82–1.77)
TSH SERPL DL<=0.005 MIU/L-ACNC: 2.14 UIU/ML (ref 0.45–4.5)
WBC # BLD AUTO: 5.6 X10E3/UL (ref 3.4–10.8)

## 2020-01-02 ENCOUNTER — OFFICE VISIT (OUTPATIENT)
Dept: ENDOCRINOLOGY | Facility: HOSPITAL | Age: 70
End: 2020-01-02
Payer: COMMERCIAL

## 2020-01-02 VITALS
WEIGHT: 144.4 LBS | BODY MASS INDEX: 24.06 KG/M2 | HEIGHT: 65 IN | SYSTOLIC BLOOD PRESSURE: 114 MMHG | DIASTOLIC BLOOD PRESSURE: 80 MMHG | HEART RATE: 80 BPM

## 2020-01-02 DIAGNOSIS — E05.90 HYPERTHYROIDISM: Primary | ICD-10-CM

## 2020-01-02 DIAGNOSIS — E05.00 GRAVES DISEASE: ICD-10-CM

## 2020-01-02 PROCEDURE — 1160F RVW MEDS BY RX/DR IN RCRD: CPT | Performed by: INTERNAL MEDICINE

## 2020-01-02 PROCEDURE — 99213 OFFICE O/P EST LOW 20 MIN: CPT | Performed by: INTERNAL MEDICINE

## 2020-01-02 NOTE — PATIENT INSTRUCTIONS
The thyroid blood work is normal   Continue the same methimazole 5 mg 1/2 tablet every other day  Follow up in 6 months with blood work

## 2020-01-02 NOTE — PROGRESS NOTES
1/3/2020    Assessment/Plan      Diagnoses and all orders for this visit:    Hyperthyroidism  -     TSH, 3rd generation Lab Collect; Future  -     T4, free Lab Collect; Future  -     T3, free; Future  -     Comprehensive metabolic panel Lab Collect; Future  -     Comprehensive metabolic panel Lab Collect; Future  -     TSH, 3rd generation Lab Collect; Future  -     T4, free Lab Collect; Future  -     T3, free; Future    Graves disease  -     TSH, 3rd generation Lab Collect; Future  -     T4, free Lab Collect; Future  -     T3, free; Future  -     Comprehensive metabolic panel Lab Collect; Future  -     Comprehensive metabolic panel Lab Collect; Future  -     TSH, 3rd generation Lab Collect; Future  -     T4, free Lab Collect; Future  -     T3, free; Future        Assessment/Plan:  1  Hyperthyroidism  Most recent thyroid function tests are normal   She is biochemically and clinically euthyroid  She will continue the same methimazole 5 mg half tablet every other day  She will continue same metoprolol  2  Graves disease  She has no evidence of Graves ophthalmopathy  I have asked her to follow up in 6 months with preceding CMP, TSH, free T4, and free T3         CC:  Hyperthyroid follow-up    History of Present Illness     HPI: Hammad Alcantara is a 68y o  year old female with history of hyperthyroidism due to Graves disease here for follow-up  Her Graves disease was diagnosed in October 2016 when she was hospitalized for atrial fibrillation  She was on methimazole until February 2018 with the hope that she was in remission but her hyperthyroidism recurred and methimazole was restarted in late April 2018  She is currently on methimazole 5 mg half tablet every other day and metoprolol XL 25 mg daily  She denies heat or cold intolerance, diarrhea or constipation, anxiety or depression, tremors, palpitation, insomnia, fatigue, dry skin, brittle nails, hair loss, or weight changes  She denies diplopia    She has no compressive thyroid symptoms or difficulties with swallowing  Review of Systems   Constitutional: Negative for fatigue and unexpected weight change  HENT: Negative for trouble swallowing  Eyes: Negative for visual disturbance  No diplopia  Respiratory: Negative for chest tightness and shortness of breath  Cardiovascular: Negative for chest pain and palpitations  Sees Dr Tunde Perez in [place]  Gastrointestinal: Negative for abdominal pain, constipation, diarrhea and nausea  Endocrine: Negative for cold intolerance and heat intolerance  Skin: Negative for rash  No dry skin  No brittle nails  No hair loss  Neurological: Negative for dizziness, tremors, light-headedness and headaches  Psychiatric/Behavioral: Negative for dysphoric mood and sleep disturbance  The patient is not nervous/anxious  Sleeping can be variable, good and bad nights         Historical Information   Past Medical History:   Diagnosis Date    Atrial fibrillation ([address] )     Hypertension     with hyperthyroidism    Osteoarthritis      Past Surgical History:   Procedure Laterality Date    TUBAL LIGATION Bilateral      Social History   Social History     Substance and Sexual Activity   Alcohol Use No     Social History     Substance and Sexual Activity   Drug Use No     Social History     Tobacco Use   Smoking Status Current Every Day Smoker    Packs/day: 0 50    Years: 0 00    Pack years: 0 00    Types: Cigarettes   Smokeless Tobacco Never Used     Family History:   Family History   Problem Relation Age of Onset    Thyroid disease unspecified Mother     No Known Problems Father     Hypothyroidism Sister     Hashimoto's thyroiditis Sister    Kimberly Hung Goiter Sister     No Known Problems Brother     Thyroid disease unspecified Sister     Thyroid disease unspecified Sister     No Known Problems Brother     No Known Problems Brother     No Known Problems Son     No Known Problems Daughter Meds/Allergies   Current Outpatient Medications   Medication Sig Dispense Refill    b complex vitamins tablet Take 1 tablet by mouth daily      lisinopril (ZESTRIL) 2 5 mg tablet Take by mouth daily   4    methimazole (TAPAZOLE) 5 mg tablet Take 0 5 tablets (2 5 mg total) by mouth every other day 90 tablet 2    metoprolol succinate (TOPROL-XL) 25 mg 24 hr tablet Take by mouth daily    4    XARELTO 20 MG tablet Take 20 mg by mouth daily with breakfast    4     No current facility-administered medications for this visit  Allergies   Allergen Reactions    Sulfamethoxazole-Trimethoprim        Objective   Vitals: Blood pressure 114/80, pulse 80, height 5' 4 57" (1 64 m), weight 65 5 kg (144 lb 6 4 oz)  Invasive Devices     None                 Physical Exam   Constitutional: She is oriented to person, place, and time  She appears well-developed and well-nourished  HENT:   Head: Normocephalic and atraumatic  Eyes: Pupils are equal, round, and reactive to light  Conjunctivae and EOM are normal    No lid lag, stare, proptosis, or periorbital edema  Neck: Normal range of motion  Neck supple  No thyromegaly present  Thyroid normal in size without palpable thyroid nodules  No bruits over the thyroid gland or carotids  Cardiovascular: Normal rate, regular rhythm and normal heart sounds  No murmur heard  Pulmonary/Chest: Effort normal and breath sounds normal  She has no wheezes  Musculoskeletal: Normal range of motion  She exhibits no edema or deformity  No tremor of the outstretched hands  Lymphadenopathy:     She has no cervical adenopathy  Neurological: She is alert and oriented to person, place, and time  She has normal reflexes  Deep tendon reflexes normal    Skin: Skin is warm and dry  No rash noted  Vitals reviewed  The history was obtained from the review of the chart and from the patient      Lab Results:   Blood work done on 12/27/2019 showed a glucose of 107 fasting but was otherwise normal   CBC was normal     Lab Results   Component Value Date    CREATININE 0 61 12/27/2019    BUN 8 12/27/2019    K 4 0 12/27/2019     12/27/2019    CO2 21 12/27/2019     Lab Results   Component Value Date    ALT 13 12/27/2019    AST 19 12/27/2019     Lab Results   Component Value Date    TSH 2 140 12/27/2019    FREET4 1 40 12/27/2019     Free T3 is 2 6      Future Appointments   Date Time Provider [facility]   7/8/2020  8:20 AM Deitra Mortimer, MD [facility]

## 2020-04-01 ENCOUNTER — TELEPHONE (OUTPATIENT)
Dept: FAMILY MEDICINE CLINIC | Facility: CLINIC | Age: 70
End: 2020-04-01

## 2020-05-07 DIAGNOSIS — E05.90 HYPERTHYROIDISM: ICD-10-CM

## 2020-05-07 DIAGNOSIS — E05.00 GRAVES DISEASE: ICD-10-CM

## 2020-05-07 RX ORDER — METHIMAZOLE 5 MG/1
2.5 TABLET ORAL EVERY OTHER DAY
Qty: 90 TABLET | Refills: 2 | Status: SHIPPED | OUTPATIENT
Start: 2020-05-07 | End: 2021-01-08 | Stop reason: SDUPTHER

## 2020-05-21 ENCOUNTER — TELEPHONE (OUTPATIENT)
Dept: FAMILY MEDICINE CLINIC | Facility: CLINIC | Age: 70
End: 2020-05-21

## 2020-06-01 ENCOUNTER — TELEPHONE (OUTPATIENT)
Dept: FAMILY MEDICINE CLINIC | Facility: CLINIC | Age: 70
End: 2020-06-01

## 2020-07-02 LAB
ALBUMIN SERPL-MCNC: 4.5 G/DL (ref 3.7–4.7)
ALBUMIN/GLOB SERPL: 2.8 {RATIO} (ref 1.2–2.2)
ALP SERPL-CCNC: 81 IU/L (ref 39–117)
ALT SERPL-CCNC: 12 IU/L (ref 0–32)
AST SERPL-CCNC: 19 IU/L (ref 0–40)
BILIRUB SERPL-MCNC: 0.8 MG/DL (ref 0–1.2)
BUN SERPL-MCNC: 8 MG/DL (ref 8–27)
BUN/CREAT SERPL: 12 (ref 12–28)
CALCIUM SERPL-MCNC: 9.3 MG/DL (ref 8.7–10.3)
CHLORIDE SERPL-SCNC: 100 MMOL/L (ref 96–106)
CO2 SERPL-SCNC: 27 MMOL/L (ref 20–29)
CREAT SERPL-MCNC: 0.68 MG/DL (ref 0.57–1)
GLOBULIN SER-MCNC: 1.6 G/DL (ref 1.5–4.5)
GLUCOSE SERPL-MCNC: 95 MG/DL (ref 65–99)
POTASSIUM SERPL-SCNC: 4.5 MMOL/L (ref 3.5–5.2)
PROT SERPL-MCNC: 6.1 G/DL (ref 6–8.5)
SL AMB EGFR AFRICAN AMERICAN: 102 ML/MIN/1.73
SL AMB EGFR NON AFRICAN AMERICAN: 88 ML/MIN/1.73
SODIUM SERPL-SCNC: 139 MMOL/L (ref 134–144)
T3FREE SERPL-MCNC: 3 PG/ML (ref 2–4.4)
T4 FREE SERPL-MCNC: 1.41 NG/DL (ref 0.82–1.77)
TSH SERPL DL<=0.005 MIU/L-ACNC: 1.89 UIU/ML (ref 0.45–4.5)

## 2020-07-08 ENCOUNTER — OFFICE VISIT (OUTPATIENT)
Dept: ENDOCRINOLOGY | Facility: HOSPITAL | Age: 70
End: 2020-07-08
Payer: COMMERCIAL

## 2020-07-08 ENCOUNTER — TELEPHONE (OUTPATIENT)
Dept: ENDOCRINOLOGY | Facility: HOSPITAL | Age: 70
End: 2020-07-08

## 2020-07-08 VITALS
TEMPERATURE: 98 F | BODY MASS INDEX: 24.46 KG/M2 | WEIGHT: 146.8 LBS | HEART RATE: 84 BPM | SYSTOLIC BLOOD PRESSURE: 110 MMHG | DIASTOLIC BLOOD PRESSURE: 66 MMHG | HEIGHT: 65 IN

## 2020-07-08 DIAGNOSIS — E05.90 HYPERTHYROIDISM: Primary | ICD-10-CM

## 2020-07-08 DIAGNOSIS — E05.00 GRAVES DISEASE: ICD-10-CM

## 2020-07-08 PROCEDURE — 3008F BODY MASS INDEX DOCD: CPT | Performed by: INTERNAL MEDICINE

## 2020-07-08 PROCEDURE — 99213 OFFICE O/P EST LOW 20 MIN: CPT | Performed by: INTERNAL MEDICINE

## 2020-07-08 PROCEDURE — 1160F RVW MEDS BY RX/DR IN RCRD: CPT | Performed by: INTERNAL MEDICINE

## 2020-07-08 NOTE — PROGRESS NOTES
7/8/2020    Assessment/Plan      Diagnoses and all orders for this visit:    Hyperthyroidism  -     CBC and differential Lab Collect; Future  -     Comprehensive metabolic panel Lab Collect; Future  -     TSH, 3rd generation Lab Collect; Future  -     T4, free Lab Collect; Future  -     T3, free; Future    Graves disease  -     CBC and differential Lab Collect; Future  -     Comprehensive metabolic panel Lab Collect; Future  -     TSH, 3rd generation Lab Collect; Future  -     T4, free Lab Collect; Future  -     T3, free; Future        Assessment/Plan:  1  Hyperthyroidism due to Graves disease  Her most recent thyroid function tests are normal   She is both biochemically and clinically euthyroid  For now, I would continue the very low-dose methimazole 5 mg half tablet every other day along with her metoprolol as when we try to discontinue her methimazole in the past, she developed hyperthyroidism again  2  Graves disease  There is no evidence of Graves ophthalmopathy  I have asked her to follow up in 6 months with preceding TSH, free T4, free T3, CMP, and CBC  CC:  Hyperthyroid follow-up    History of Present Illness     HPI: Kirstie Trinh is a 68y o  year old female with history of hyperthyroidism due to Graves disease for follow-up visit  She was diagnosed with Graves disease in 2016 when she was hospitalized for atrial fibrillation  She was placed on methimazole until February 2018 when she was thought to be in remission  Unfortunately, her hyperthyroidism recurred and methimazole was restarted in late April 2018  She is currently taking methimazole 5 mg half tablet every other day and metoprolol XL 25 mg daily  Weight is 2 lb more than last visit  She denies heat or cold intolerance, diarrhea constipation, anxiety or depression, insomnia, or fatigue  She denies dry skin, brittle nails, or hair loss    She says she has had no recurrence of atrial fibrillation will be following up with her cardiologist in September  She has no diplopia  She has no compressive thyroid symptoms or difficulties with swallowing  Review of Systems   Constitutional: Negative for fatigue and unexpected weight change  Weight 2 lb more than last visit  HENT: Negative for trouble swallowing  Eyes: Negative for visual disturbance  No diplopia  Respiratory: Negative for chest tightness and shortness of breath  Cardiovascular: Negative for chest pain and palpitations  No recurrence of a fib  Cardiology visit due sept 2020  Gastrointestinal: Negative for abdominal pain, constipation, diarrhea and nausea  Endocrine: Negative for cold intolerance and heat intolerance  Skin: Negative for rash  No dry skin  No brittle nails  No hair loss  Neurological: Negative for dizziness, tremors, light-headedness and headaches  Psychiatric/Behavioral: Negative for dysphoric mood and sleep disturbance  The patient is not nervous/anxious  Stress with daughter issues         Historical Information   Past Medical History:   Diagnosis Date    Atrial fibrillation ([address] )     Hypertension     with hyperthyroidism    Osteoarthritis      Past Surgical History:   Procedure Laterality Date    TUBAL LIGATION Bilateral      Social History   Social History     Substance and Sexual Activity   Alcohol Use No     Social History     Substance and Sexual Activity   Drug Use No     Social History     Tobacco Use   Smoking Status Current Every Day Smoker    Packs/day: 0 50    Years: 0 00    Pack years: 0 00    Types: Cigarettes   Smokeless Tobacco Never Used     Family History:   Family History   Problem Relation Age of Onset    Thyroid disease unspecified Mother     No Known Problems Father     Hypothyroidism Sister     Hashimoto's thyroiditis Sister    Mara Nine Goiter Sister     No Known Problems Brother     Thyroid disease unspecified Sister     Thyroid disease unspecified Sister     No Known Problems Brother     No Known Problems Brother     No Known Problems Son     No Known Problems Daughter        Meds/Allergies   Current Outpatient Medications   Medication Sig Dispense Refill    b complex vitamins tablet Take 1 tablet by mouth daily      lisinopril (ZESTRIL) 2 5 mg tablet Take by mouth daily   4    methimazole (TAPAZOLE) 5 mg tablet TAKE 0 5 TABLETS (2 5 MG TOTAL) BY MOUTH EVERY OTHER DAY 90 tablet 2    metoprolol succinate (TOPROL-XL) 25 mg 24 hr tablet Take by mouth daily    4    XARELTO 20 MG tablet Take 20 mg by mouth daily with breakfast    4     No current facility-administered medications for this visit  Allergies   Allergen Reactions    Sulfamethoxazole-Trimethoprim        Objective   Vitals: Blood pressure 110/66, pulse 84, temperature 98 °F (36 7 °C), height 5' 4 57" (1 64 m), weight 66 6 kg (146 lb 12 8 oz)  Invasive Devices     None                 Physical Exam   Constitutional: She is oriented to person, place, and time  She appears well-developed and well-nourished  HENT:   Head: Normocephalic and atraumatic  Eyes: Conjunctivae and EOM are normal    No lid lag, stare, proptosis, or periorbital edema  Neck: Normal range of motion  Neck supple  No thyromegaly present  Thyroid normal in size  No bruits over the thyroid gland or carotids  Cardiovascular: Normal rate, regular rhythm and normal heart sounds  No murmur heard  Pulmonary/Chest: Effort normal and breath sounds normal  She has no wheezes  Abdominal: Soft  There is no tenderness  Musculoskeletal: Normal range of motion  She exhibits no edema or deformity  No tremor of the outstretched hands  Lymphadenopathy:     She has no cervical adenopathy  Neurological: She is alert and oriented to person, place, and time  She has normal reflexes  Deep tendon reflexes normal    Skin: Skin is warm and dry  No rash noted  Vitals reviewed        The history was obtained from the review of the chart and from the patient  Lab Results:      CMP done on 07/01/2020 showed a glucose of 95 but was otherwise normal     Lab Results   Component Value Date    CREATININE 0 68 07/01/2020    CREATININE 0 61 12/27/2019    BUN 8 07/01/2020    K 4 5 07/01/2020     07/01/2020    CO2 27 07/01/2020     Lab Results   Component Value Date    ALT 12 07/01/2020    AST 19 07/01/2020       Lab Results   Component Value Date    TSH 1 890 07/01/2020    FREET4 1 41 07/01/2020     Free T3 is 3  No future appointments

## 2020-07-08 NOTE — PATIENT INSTRUCTIONS
Thyroid blood work is all normal   Continue the same methimazole 5 mg half tablet every other day  Continue the same metoprolol XL 25 mg daily  Follow-up in 6 months with blood work

## 2021-01-08 DIAGNOSIS — E05.00 GRAVES DISEASE: ICD-10-CM

## 2021-01-08 DIAGNOSIS — E05.90 HYPERTHYROIDISM: ICD-10-CM

## 2021-01-08 RX ORDER — METHIMAZOLE 5 MG/1
2.5 TABLET ORAL EVERY OTHER DAY
Qty: 24 TABLET | Refills: 1 | Status: SHIPPED | OUTPATIENT
Start: 2021-01-08 | End: 2021-05-10 | Stop reason: SDUPTHER

## 2021-05-10 DIAGNOSIS — E05.00 GRAVES DISEASE: ICD-10-CM

## 2021-05-10 DIAGNOSIS — E05.90 HYPERTHYROIDISM: ICD-10-CM

## 2021-05-10 RX ORDER — METHIMAZOLE 5 MG/1
2.5 TABLET ORAL EVERY OTHER DAY
Qty: 24 TABLET | Refills: 1 | Status: SHIPPED | OUTPATIENT
Start: 2021-05-10 | End: 2021-07-09 | Stop reason: SDUPTHER

## 2021-05-21 ENCOUNTER — VBI (OUTPATIENT)
Dept: ADMINISTRATIVE | Facility: OTHER | Age: 71
End: 2021-05-21

## 2021-07-09 ENCOUNTER — TELEPHONE (OUTPATIENT)
Dept: ENDOCRINOLOGY | Facility: HOSPITAL | Age: 71
End: 2021-07-09

## 2021-07-09 DIAGNOSIS — E05.90 HYPERTHYROIDISM: ICD-10-CM

## 2021-07-09 DIAGNOSIS — E05.00 GRAVES DISEASE: ICD-10-CM

## 2021-07-09 RX ORDER — METHIMAZOLE 5 MG/1
2.5 TABLET ORAL EVERY OTHER DAY
Qty: 24 TABLET | Refills: 1 | Status: SHIPPED | OUTPATIENT
Start: 2021-07-09 | End: 2021-09-14 | Stop reason: SDUPTHER

## 2021-07-09 NOTE — TELEPHONE ENCOUNTER
Patient left a message for us to call her back to schedule an appointment  She also needs a refill of her thyroid medication  Was last seen 20  Cancelled 21 and 21 appointments  Lab slip dated 20 has   Her  has been in and out of the hospital and she can't get out to get her blood work done  Left message for her to call us back

## 2021-07-12 DIAGNOSIS — E05.90 HYPERTHYROIDISM: Primary | ICD-10-CM

## 2021-09-10 LAB
ALBUMIN SERPL-MCNC: 4.5 G/DL (ref 3.7–4.7)
ALBUMIN/GLOB SERPL: 2.3 {RATIO} (ref 1.2–2.2)
ALP SERPL-CCNC: 105 IU/L (ref 48–121)
ALT SERPL-CCNC: 12 IU/L (ref 0–32)
AST SERPL-CCNC: 19 IU/L (ref 0–40)
BASOPHILS # BLD AUTO: 0.1 X10E3/UL (ref 0–0.2)
BASOPHILS NFR BLD AUTO: 2 %
BILIRUB SERPL-MCNC: 0.4 MG/DL (ref 0–1.2)
BUN SERPL-MCNC: 4 MG/DL (ref 8–27)
BUN/CREAT SERPL: 7 (ref 12–28)
CALCIUM SERPL-MCNC: 9.2 MG/DL (ref 8.7–10.3)
CHLORIDE SERPL-SCNC: 101 MMOL/L (ref 96–106)
CO2 SERPL-SCNC: 25 MMOL/L (ref 20–29)
CREAT SERPL-MCNC: 0.6 MG/DL (ref 0.57–1)
EOSINOPHIL # BLD AUTO: 0.1 X10E3/UL (ref 0–0.4)
EOSINOPHIL NFR BLD AUTO: 2 %
ERYTHROCYTE [DISTWIDTH] IN BLOOD BY AUTOMATED COUNT: 12.6 % (ref 11.7–15.4)
GLOBULIN SER-MCNC: 2 G/DL (ref 1.5–4.5)
GLUCOSE SERPL-MCNC: 90 MG/DL (ref 65–99)
HCT VFR BLD AUTO: 40.8 % (ref 34–46.6)
HGB BLD-MCNC: 14.1 G/DL (ref 11.1–15.9)
IMM GRANULOCYTES # BLD: 0 X10E3/UL (ref 0–0.1)
IMM GRANULOCYTES NFR BLD: 0 %
LYMPHOCYTES # BLD AUTO: 1.8 X10E3/UL (ref 0.7–3.1)
LYMPHOCYTES NFR BLD AUTO: 32 %
MCH RBC QN AUTO: 31.8 PG (ref 26.6–33)
MCHC RBC AUTO-ENTMCNC: 34.6 G/DL (ref 31.5–35.7)
MCV RBC AUTO: 92 FL (ref 79–97)
MONOCYTES # BLD AUTO: 0.4 X10E3/UL (ref 0.1–0.9)
MONOCYTES NFR BLD AUTO: 8 %
NEUTROPHILS # BLD AUTO: 3.1 X10E3/UL (ref 1.4–7)
NEUTROPHILS NFR BLD AUTO: 56 %
PLATELET # BLD AUTO: 233 X10E3/UL (ref 150–450)
POTASSIUM SERPL-SCNC: 4.5 MMOL/L (ref 3.5–5.2)
PROT SERPL-MCNC: 6.5 G/DL (ref 6–8.5)
RBC # BLD AUTO: 4.43 X10E6/UL (ref 3.77–5.28)
SL AMB EGFR AFRICAN AMERICAN: 105 ML/MIN/1.73
SL AMB EGFR NON AFRICAN AMERICAN: 91 ML/MIN/1.73
SODIUM SERPL-SCNC: 138 MMOL/L (ref 134–144)
T3FREE SERPL-MCNC: 2.9 PG/ML (ref 2–4.4)
T4 FREE SERPL-MCNC: 1.26 NG/DL (ref 0.82–1.77)
TSH SERPL DL<=0.005 MIU/L-ACNC: 2.87 UIU/ML (ref 0.45–4.5)
WBC # BLD AUTO: 5.5 X10E3/UL (ref 3.4–10.8)

## 2021-09-14 ENCOUNTER — OFFICE VISIT (OUTPATIENT)
Dept: ENDOCRINOLOGY | Facility: HOSPITAL | Age: 71
End: 2021-09-14
Payer: COMMERCIAL

## 2021-09-14 VITALS
BODY MASS INDEX: 24.36 KG/M2 | HEART RATE: 64 BPM | SYSTOLIC BLOOD PRESSURE: 120 MMHG | WEIGHT: 146.2 LBS | HEIGHT: 65 IN | DIASTOLIC BLOOD PRESSURE: 72 MMHG

## 2021-09-14 DIAGNOSIS — E05.00 GRAVES DISEASE: ICD-10-CM

## 2021-09-14 DIAGNOSIS — E05.90 HYPERTHYROIDISM: Primary | ICD-10-CM

## 2021-09-14 PROCEDURE — 99213 OFFICE O/P EST LOW 20 MIN: CPT | Performed by: INTERNAL MEDICINE

## 2021-09-14 RX ORDER — METHIMAZOLE 5 MG/1
2.5 TABLET ORAL EVERY OTHER DAY
Qty: 24 TABLET | Refills: 3 | Status: SHIPPED | OUTPATIENT
Start: 2021-09-14

## 2021-09-14 NOTE — PATIENT INSTRUCTIONS
The thyroid blood work is normal still  Continue the same methimazole 5 mg 1/2 tablet every other day  Follow up in 6 months with blood work

## 2021-09-14 NOTE — PROGRESS NOTES
9/14/2021    Assessment/Plan      Diagnoses and all orders for this visit:    Hyperthyroidism  -     TSH, 3rd generation Lab Collect; Future  -     T4, free Lab Collect; Future  -     T3, free; Future  -     Comprehensive metabolic panel Lab Collect; Future  -     CBC and differential Lab Collect; Future  -     methimazole (TAPAZOLE) 5 mg tablet; Take 0 5 tablets (2 5 mg total) by mouth every other day    Graves disease  -     TSH, 3rd generation Lab Collect; Future  -     T4, free Lab Collect; Future  -     T3, free; Future  -     Comprehensive metabolic panel Lab Collect; Future  -     CBC and differential Lab Collect; Future  -     methimazole (TAPAZOLE) 5 mg tablet; Take 0 5 tablets (2 5 mg total) by mouth every other day        Assessment/Plan:    1  Hyperthyroidism  Most recent thyroid function tests are normal   Her TSH is slowly climbing but still in the mid normal range  For now, she is biochemically and clinically euthyroid and will continue the same methimazole 5 mg half tablet or 2 5 mg every other day  My hope is that I will be able to discontinue her methimazole in the future when her TSH is in the high normal range  I do not want to discontinue it too soon and her not be in remission  2  Graves disease  She has no evidence of Graves ophthalmopathy  I have asked her to follow up in 6 months with preceding TSH, free T4, free T3, CMP, and CBC  CC:   Hyperthyroid follow-up    History of Present Illness     HPI: Sindhu Hagen is a 65y o  year old female with history of Hyperthyroidism due to Graves disease for follow-up visit  She was diagnosed with Graves disease in 2016 when she was hospitalized for atrial fibrillation  She was placed on methimazole until February 2018 when she was thought to be in remission  Unfortunately, her hyperthyroidism recurred after methimazole was discontinued in it was restarted in late April 2018  She was last seen in July 2020 was lost to follow-up over the last year  She is currently taking methimazole 5 mg half tablet or 2 5 mg every other day and metoprolol 25 mg daily  She generally feels well with occasional fatigue episodes  She has unchanged dry skin but no brittle nails or hair loss  She has had no recent Afib events and denies palpitations  She denies heat or cold intolerance, diarrhea constipation, anxiety or depression, tremors, or insomnia  She is under lot of stress with her 's health issues  She has no weight changes  She denies diplopia  She denies compressive thyroid symptoms or difficulties with swallowing  Review of Systems   Constitutional: Positive for fatigue  Negative for unexpected weight change  Occasional fatigue episodes  HENT: Negative for trouble swallowing  Eyes: Negative for visual disturbance  No diplopia  Respiratory: Negative for chest tightness and shortness of breath  Cardiovascular: Negative for chest pain and palpitations  No recent a fib events  Follows with cardiology regularly  Gastrointestinal: Negative for abdominal pain, constipation, diarrhea and nausea  Endocrine: Negative for cold intolerance and heat intolerance  Skin: Negative for rash  Has dry skin unchanged  No brittle nails  No hair loss  Neurological: Positive for headaches  Negative for dizziness, tremors and light-headedness  Occasional sinus headaches  Psychiatric/Behavioral: Negative for dysphoric mood and sleep disturbance  The patient is not nervous/anxious  Lots of stress over the last year with  in and out of the hospital a lot   is 78         Historical Information   Past Medical History:   Diagnosis Date    Atrial fibrillation ([address] )     Hypertension     with hyperthyroidism    Osteoarthritis      Past Surgical History:   Procedure Laterality Date    TUBAL LIGATION Bilateral      Social History   Social History     Substance and Sexual Activity   Alcohol Use No     Social History     Substance and Sexual Activity   Drug Use No     Social History     Tobacco Use   Smoking Status Current Every Day Smoker    Packs/day: 0 50    Years: 0 00    Pack years: 0 00    Types: Cigarettes   Smokeless Tobacco Never Used     Family History:   Family History   Problem Relation Age of Onset    Thyroid disease unspecified Mother     No Known Problems Father     Hypothyroidism Sister     Hashimoto's thyroiditis Sister     Goiter Sister     Rheum arthritis Brother     Thyroid disease unspecified Sister     Thyroid disease unspecified Sister     No Known Problems Brother     No Known Problems Brother     No Known Problems Son     No Known Problems Daughter        Meds/Allergies   Current Outpatient Medications   Medication Sig Dispense Refill    b complex vitamins tablet Take 1 tablet by mouth daily      lisinopril (ZESTRIL) 2 5 mg tablet Take by mouth daily   4    methimazole (TAPAZOLE) 5 mg tablet Take 0 5 tablets (2 5 mg total) by mouth every other day 24 tablet 3    metoprolol succinate (TOPROL-XL) 25 mg 24 hr tablet Take by mouth daily    4    XARELTO 20 MG tablet Take 20 mg by mouth daily with breakfast    4     No current facility-administered medications for this visit  Allergies   Allergen Reactions    Sulfamethoxazole-Trimethoprim        Objective   Vitals: Blood pressure 120/72, pulse 64, height 5' 4 57" (1 64 m), weight 66 3 kg (146 lb 3 2 oz)  Invasive Devices     None                 Physical Exam  Vitals reviewed  Constitutional:       Appearance: Normal appearance  She is well-developed  HENT:      Head: Normocephalic and atraumatic  Eyes:      Extraocular Movements: Extraocular movements intact  Conjunctiva/sclera: Conjunctivae normal       Comments: No lid lag, stare, proptosis, or periorbital edema  Neck:      Thyroid: No thyromegaly  Vascular: No carotid bruit  Comments: Thyroid normal in size    No palpable thyroid nodules  No bruits over the thyroid gland  Cardiovascular:      Rate and Rhythm: Normal rate and regular rhythm  Heart sounds: Normal heart sounds  No murmur heard  Pulmonary:      Effort: Pulmonary effort is normal       Breath sounds: Normal breath sounds  No wheezing  Abdominal:      Palpations: Abdomen is soft  Musculoskeletal:         General: No deformity  Normal range of motion  Cervical back: Normal range of motion and neck supple  Right lower leg: No edema  Left lower leg: No edema  Comments: No tremor of the outstretched hands  Lymphadenopathy:      Cervical: No cervical adenopathy  Skin:     General: Skin is warm and dry  Findings: No rash  Neurological:      Mental Status: She is alert and oriented to person, place, and time  Deep Tendon Reflexes: Reflexes are normal and symmetric  Comments: Deep tendon reflexes normal          The history was obtained from the review of the chart and from the patient  Lab Results:     Blood work done on 09/09/2021 showed a TSH of 2 87 with a free T4 of 1 26 and a free T3 of 2 9          CMP showed a glucose of 90 but was otherwise normal       CBC is normal     Lab Results   Component Value Date    CREATININE 0 60 09/09/2021    CREATININE 0 68 07/01/2020    CREATININE 0 61 12/27/2019    BUN 4 (L) 09/09/2021    K 4 5 09/09/2021     09/09/2021    CO2 25 09/09/2021       Lab Results   Component Value Date    ALT 12 09/09/2021    AST 19 09/09/2021           Future Appointments   Date Time Provider Parisa Taveras   3/15/2022  8:20 AM Gino Gaviria PA-C [facility]

## 2021-10-11 ENCOUNTER — TELEPHONE (OUTPATIENT)
Dept: FAMILY MEDICINE CLINIC | Facility: CLINIC | Age: 71
End: 2021-10-11

## 2021-10-13 ENCOUNTER — OFFICE VISIT (OUTPATIENT)
Dept: FAMILY MEDICINE CLINIC | Facility: CLINIC | Age: 71
End: 2021-10-13
Payer: COMMERCIAL

## 2021-10-13 VITALS
HEIGHT: 64 IN | SYSTOLIC BLOOD PRESSURE: 126 MMHG | TEMPERATURE: 97.6 F | OXYGEN SATURATION: 93 % | BODY MASS INDEX: 24.31 KG/M2 | WEIGHT: 142.4 LBS | DIASTOLIC BLOOD PRESSURE: 78 MMHG | HEART RATE: 88 BPM

## 2021-10-13 DIAGNOSIS — R30.0 DYSURIA: ICD-10-CM

## 2021-10-13 DIAGNOSIS — Z00.00 MEDICARE ANNUAL WELLNESS VISIT, SUBSEQUENT: ICD-10-CM

## 2021-10-13 DIAGNOSIS — N30.01 ACUTE CYSTITIS WITH HEMATURIA: Primary | ICD-10-CM

## 2021-10-13 DIAGNOSIS — I48.11 LONGSTANDING PERSISTENT ATRIAL FIBRILLATION (HCC): ICD-10-CM

## 2021-10-13 LAB
SL AMB  POCT GLUCOSE, UA: NEGATIVE
SL AMB LEUKOCYTE ESTERASE,UA: ABNORMAL
SL AMB POCT BILIRUBIN,UA: ABNORMAL
SL AMB POCT BLOOD,UA: ABNORMAL
SL AMB POCT CLARITY,UA: ABNORMAL
SL AMB POCT COLOR,UA: ABNORMAL
SL AMB POCT KETONES,UA: ABNORMAL
SL AMB POCT NITRITE,UA: POSITIVE
SL AMB POCT PH,UA: 6
SL AMB POCT SPECIFIC GRAVITY,UA: 1.01
SL AMB POCT URINE PROTEIN: ABNORMAL
SL AMB POCT UROBILINOGEN: 2

## 2021-10-13 PROCEDURE — G0439 PPPS, SUBSEQ VISIT: HCPCS | Performed by: NURSE PRACTITIONER

## 2021-10-13 PROCEDURE — 1160F RVW MEDS BY RX/DR IN RCRD: CPT | Performed by: NURSE PRACTITIONER

## 2021-10-13 PROCEDURE — 3288F FALL RISK ASSESSMENT DOCD: CPT | Performed by: NURSE PRACTITIONER

## 2021-10-13 PROCEDURE — 1170F FXNL STATUS ASSESSED: CPT | Performed by: NURSE PRACTITIONER

## 2021-10-13 PROCEDURE — 81002 URINALYSIS NONAUTO W/O SCOPE: CPT | Performed by: NURSE PRACTITIONER

## 2021-10-13 PROCEDURE — 3008F BODY MASS INDEX DOCD: CPT | Performed by: NURSE PRACTITIONER

## 2021-10-13 PROCEDURE — 1125F AMNT PAIN NOTED PAIN PRSNT: CPT | Performed by: NURSE PRACTITIONER

## 2021-10-13 PROCEDURE — 99213 OFFICE O/P EST LOW 20 MIN: CPT | Performed by: NURSE PRACTITIONER

## 2021-10-13 PROCEDURE — 3725F SCREEN DEPRESSION PERFORMED: CPT | Performed by: NURSE PRACTITIONER

## 2021-10-13 RX ORDER — NITROFURANTOIN 25; 75 MG/1; MG/1
100 CAPSULE ORAL 2 TIMES DAILY
Qty: 10 CAPSULE | Refills: 0 | Status: SHIPPED | OUTPATIENT
Start: 2021-10-13 | End: 2021-10-18

## 2021-10-15 ENCOUNTER — TELEPHONE (OUTPATIENT)
Dept: FAMILY MEDICINE CLINIC | Facility: CLINIC | Age: 71
End: 2021-10-15

## 2021-10-15 DIAGNOSIS — N30.00 ACUTE CYSTITIS WITHOUT HEMATURIA: Primary | ICD-10-CM

## 2021-10-15 RX ORDER — AMOXICILLIN AND CLAVULANATE POTASSIUM 875; 125 MG/1; MG/1
1 TABLET, FILM COATED ORAL EVERY 12 HOURS SCHEDULED
Qty: 14 TABLET | Refills: 0 | Status: SHIPPED | OUTPATIENT
Start: 2021-10-15 | End: 2021-10-22

## 2021-10-16 DIAGNOSIS — N30.01 ACUTE CYSTITIS WITH HEMATURIA: ICD-10-CM

## 2021-10-17 LAB
BACTERIA UR CULT: ABNORMAL
Lab: ABNORMAL
SL AMB ANTIMICROBIAL SUSCEPTIBILITY: ABNORMAL

## 2021-10-18 RX ORDER — NITROFURANTOIN 25; 75 MG/1; MG/1
100 CAPSULE ORAL 2 TIMES DAILY
Qty: 10 CAPSULE | Refills: 0 | Status: SHIPPED | OUTPATIENT
Start: 2021-10-18 | End: 2021-10-23

## 2022-03-08 LAB
ALBUMIN SERPL-MCNC: 4.5 G/DL (ref 3.7–4.7)
ALBUMIN/GLOB SERPL: 2.3 {RATIO} (ref 1.2–2.2)
ALP SERPL-CCNC: 111 IU/L (ref 44–121)
ALT SERPL-CCNC: 11 IU/L (ref 0–32)
AST SERPL-CCNC: 17 IU/L (ref 0–40)
BASOPHILS # BLD AUTO: 0.1 X10E3/UL (ref 0–0.2)
BASOPHILS NFR BLD AUTO: 1 %
BILIRUB SERPL-MCNC: 0.4 MG/DL (ref 0–1.2)
BUN SERPL-MCNC: 6 MG/DL (ref 8–27)
BUN/CREAT SERPL: 10 (ref 12–28)
CALCIUM SERPL-MCNC: 9.4 MG/DL (ref 8.7–10.3)
CHLORIDE SERPL-SCNC: 105 MMOL/L (ref 96–106)
CO2 SERPL-SCNC: 24 MMOL/L (ref 20–29)
CREAT SERPL-MCNC: 0.6 MG/DL (ref 0.57–1)
EGFR: 95 ML/MIN/1.73
EOSINOPHIL # BLD AUTO: 0.1 X10E3/UL (ref 0–0.4)
EOSINOPHIL NFR BLD AUTO: 2 %
ERYTHROCYTE [DISTWIDTH] IN BLOOD BY AUTOMATED COUNT: 12.5 % (ref 11.7–15.4)
GLOBULIN SER-MCNC: 2 G/DL (ref 1.5–4.5)
GLUCOSE SERPL-MCNC: 87 MG/DL (ref 65–99)
HCT VFR BLD AUTO: 42.2 % (ref 34–46.6)
HGB BLD-MCNC: 14.5 G/DL (ref 11.1–15.9)
IMM GRANULOCYTES # BLD: 0 X10E3/UL (ref 0–0.1)
IMM GRANULOCYTES NFR BLD: 0 %
LYMPHOCYTES # BLD AUTO: 1.8 X10E3/UL (ref 0.7–3.1)
LYMPHOCYTES NFR BLD AUTO: 24 %
MCH RBC QN AUTO: 32 PG (ref 26.6–33)
MCHC RBC AUTO-ENTMCNC: 34.4 G/DL (ref 31.5–35.7)
MCV RBC AUTO: 93 FL (ref 79–97)
MONOCYTES # BLD AUTO: 0.5 X10E3/UL (ref 0.1–0.9)
MONOCYTES NFR BLD AUTO: 7 %
NEUTROPHILS # BLD AUTO: 5.1 X10E3/UL (ref 1.4–7)
NEUTROPHILS NFR BLD AUTO: 66 %
PLATELET # BLD AUTO: 242 X10E3/UL (ref 150–450)
POTASSIUM SERPL-SCNC: 4.6 MMOL/L (ref 3.5–5.2)
PROT SERPL-MCNC: 6.5 G/DL (ref 6–8.5)
RBC # BLD AUTO: 4.53 X10E6/UL (ref 3.77–5.28)
SODIUM SERPL-SCNC: 144 MMOL/L (ref 134–144)
T3FREE SERPL-MCNC: 2.6 PG/ML (ref 2–4.4)
T4 FREE SERPL-MCNC: 1.08 NG/DL (ref 0.82–1.77)
TSH SERPL DL<=0.005 MIU/L-ACNC: 4.55 UIU/ML (ref 0.45–4.5)
WBC # BLD AUTO: 7.6 X10E3/UL (ref 3.4–10.8)

## 2022-03-15 ENCOUNTER — OFFICE VISIT (OUTPATIENT)
Dept: ENDOCRINOLOGY | Facility: HOSPITAL | Age: 72
End: 2022-03-15
Payer: COMMERCIAL

## 2022-03-15 VITALS
WEIGHT: 139.2 LBS | SYSTOLIC BLOOD PRESSURE: 110 MMHG | DIASTOLIC BLOOD PRESSURE: 78 MMHG | HEIGHT: 64 IN | BODY MASS INDEX: 23.76 KG/M2 | HEART RATE: 88 BPM | OXYGEN SATURATION: 96 %

## 2022-03-15 DIAGNOSIS — E05.90 HYPERTHYROIDISM: Primary | ICD-10-CM

## 2022-03-15 DIAGNOSIS — E05.00 GRAVES DISEASE: ICD-10-CM

## 2022-03-15 PROCEDURE — 1160F RVW MEDS BY RX/DR IN RCRD: CPT | Performed by: PHYSICIAN ASSISTANT

## 2022-03-15 PROCEDURE — 99213 OFFICE O/P EST LOW 20 MIN: CPT | Performed by: PHYSICIAN ASSISTANT

## 2022-03-15 PROCEDURE — 3008F BODY MASS INDEX DOCD: CPT | Performed by: PHYSICIAN ASSISTANT

## 2022-03-15 NOTE — PATIENT INSTRUCTIONS
Continue methimazole 2 5 mg every other day  Contact the office if there is any change in symptoms  Follow-up in 6 months with lab work completed prior to visit

## 2022-03-15 NOTE — PROGRESS NOTES
Toro Nation 66 y o  female MRN: [id number]    Encounter: [id number]      Assessment/Plan     Assessment: This is a 66y o -year-old female with hyperthyroidism due to Graves disease  Plan:  1  Hyperthyroidism:  Most recent thyroid lab work shows a slightly elevated TSH, and normal free T3 and free T4  However, the free T3 and free T4 are trending towards the hyperactive side  Did discuss with her possibly discontinuing the medication, but she is clinically euthyroid at this time, and fine with taking the methimazole  I asked her to repeat lab work in 3 months to see if she still trending towards the hypoactive side  If symptoms do develop in the meantime, please contact the office  Will have her follow-up again in 6 months with lab work completed prior to visit  2  Graves disease:  No evidence of Graves ophthalmopathy  CC:  Hyperthyroid follow-up    History of Present Illness     HPI:  Toro Nation is a 66year old female with hyperthyroidism due to Graves disease for follow-up visit  She was diagnosed with Graves disease in 2016 when she was hospitalized for atrial fibrillation  She was placed on methimazole until February 2018 when she was thought to be in remission  Unfortunately, her hyperthyroidism recurred after methimazole was discontinued in it was restarted in late April 2018       She is currently taking methimazole 5 mg half tablet or 2 5 mg every other day and metoprolol 25 mg daily  She generally feels well with occasional fatigue episodes  She has unchanged dry skin but no brittle nails or hair loss  She has had no recent Afib events and denies palpitations  She denies heat or cold intolerance, diarrhea constipation, anxiety or depression, tremors, or insomnia  She has no weight changes  She denies diplopia  She denies compressive thyroid symptoms or difficulties with swallowing  Overall feeling well today, however her  did pass away in January      Review of Systems   Constitutional: Positive for fatigue ( occasional)  Negative for activity change, appetite change, diaphoresis and unexpected weight change  HENT: Negative for sore throat, trouble swallowing and voice change  Eyes: Negative for visual disturbance  Respiratory: Negative for chest tightness and shortness of breath  Cardiovascular: Negative for chest pain, palpitations and leg swelling  Gastrointestinal: Negative for abdominal pain, constipation and diarrhea  Endocrine: Negative for cold intolerance, heat intolerance, polydipsia, polyphagia and polyuria  Skin: Negative for rash  Dry skin   Neurological: Negative for dizziness, tremors, light-headedness, numbness and headaches  Hematological: Negative for adenopathy  Psychiatric/Behavioral: Negative for dysphoric mood and sleep disturbance  The patient is not nervous/anxious          Historical Information   Past Medical History:   Diagnosis Date    At[address] [address]     Hypertension     with hyperthyroidism    Osteoarthritis      Past Surgical History:   Procedure Laterality Date    TUBAL LIGATION Bilateral      Social History   Social History     Substance and Sexual Activity   Alcohol Use No     Social History     Substance and Sexual Activity   Drug Use No     Social History     Tobacco Use   Smoking Status Current Every Day Smoker    Packs/day: 0 50    Years: 0 00    Pack years: 0 00    Types: Cigarettes   Smokeless Tobacco Never Used     Family History:   Family History   Problem Relation Age of Onset    Thyroid disease unspecified Mother     No Known Problems Father     Hypothyroidism Sister     Hashimoto's thyroiditis Sister     Goiter Sister     Rheum arthritis Brother     Thyroid disease unspecified Sister     Thyroid disease unspecified Sister     No Known Problems Brother     No Known Problems Brother     No Known Problems Son     No Known Problems Daughter        Meds/Allergies   Current Outpatient Medications   Medication Sig Dispense Refill    b complex vitamins tablet Take 1 tablet by mouth daily      lisinopril (ZESTRIL) 2 5 mg tablet Take by mouth daily   4    methimazole (TAPAZOLE) 5 mg tablet Take 0 5 tablets (2 5 mg total) by mouth every other day 24 tablet 3    metoprolol succinate (TOPROL-XL) 25 mg 24 hr tablet Take by mouth daily    4    XARELTO 20 MG tablet Take 20 mg by mouth daily with breakfast    4     No current facility-administered medications for this visit  Allergies   Allergen Reactions    Sulfamethoxazole-Trimethoprim        Objective   Vitals: There were no vitals taken for this visit  Physical Exam  Vitals and nursing note reviewed  Constitutional:       General: She is not in acute distress  Appearance: Normal appearance  She is not diaphoretic  HENT:      Head: Normocephalic and atraumatic  Eyes:      General: No scleral icterus  Extraocular Movements: Extraocular movements intact  Conjunctiva/sclera: Conjunctivae normal       Pupils: Pupils are equal, round, and reactive to light  Cardiovascular:      Rate and Rhythm: Normal rate and regular rhythm  Heart sounds: No murmur heard  Pulmonary:      Effort: Pulmonary effort is normal  No respiratory distress  Breath sounds: Normal breath sounds  No wheezing  Musculoskeletal:      Cervical back: Normal range of motion  Right lower leg: No edema  Left lower leg: No edema  Lymphadenopathy:      Cervical: No cervical adenopathy  Neurological:      Mental Status: She is alert and oriented to person, place, and time  Mental status is at baseline  Sensory: No sensory deficit  Motor: No tremor  Gait: Gait normal       Deep Tendon Reflexes:      Reflex Scores:       Patellar reflexes are 2+ on the right side and 2+ on the left side  Psychiatric:         Mood and Affect: Mood normal          Behavior: Behavior normal          Thought Content:  Thought content normal          The history was obtained from the review of the chart, patient  Lab Results:   Lab Results   Component Value Date/Time    Free t4 1 08 03/07/2022 08:32 AM    Free t4 1 26 09/09/2021 09:28 AM       Imaging Studies:   Results for orders placed during the hospital encounter of 12/07/16    US thyroid    Impression  Enlarged, heterogeneous, and hypervascular thyroid gland without evidence of discrete nodule or mass  Workstation performed: [id number]      I have personally reviewed pertinent reports  Portions of the record may have been created with voice recognition software  Occasional wrong word or "sound a like" substitutions may have occurred due to the inherent limitations of voice recognition software  Read the chart carefully and recognize, using context, where substitutions have occurred  GERD (gastroesophageal reflux disease)

## 2022-04-28 ENCOUNTER — VBI (OUTPATIENT)
Dept: ADMINISTRATIVE | Facility: OTHER | Age: 72
End: 2022-04-28

## 2022-06-15 LAB
T3FREE SERPL-MCNC: 2.7 PG/ML (ref 2–4.4)
T4 FREE SERPL-MCNC: 1.13 NG/DL (ref 0.82–1.77)
TSH SERPL DL<=0.005 MIU/L-ACNC: 3.59 UIU/ML (ref 0.45–4.5)

## 2022-08-26 LAB
T3FREE SERPL-MCNC: 2.8 PG/ML (ref 2–4.4)
T4 FREE SERPL-MCNC: 1.16 NG/DL (ref 0.82–1.77)
TSH SERPL DL<=0.005 MIU/L-ACNC: 3.29 UIU/ML (ref 0.45–4.5)

## 2022-09-19 ENCOUNTER — OFFICE VISIT (OUTPATIENT)
Dept: ENDOCRINOLOGY | Facility: HOSPITAL | Age: 72
End: 2022-09-19
Payer: COMMERCIAL

## 2022-09-19 VITALS
HEIGHT: 64 IN | DIASTOLIC BLOOD PRESSURE: 80 MMHG | HEART RATE: 100 BPM | SYSTOLIC BLOOD PRESSURE: 124 MMHG | BODY MASS INDEX: 24.21 KG/M2 | WEIGHT: 141.8 LBS

## 2022-09-19 DIAGNOSIS — E05.90 HYPERTHYROIDISM: Primary | ICD-10-CM

## 2022-09-19 DIAGNOSIS — E05.00 GRAVES DISEASE: ICD-10-CM

## 2022-09-19 PROCEDURE — 99213 OFFICE O/P EST LOW 20 MIN: CPT | Performed by: PHYSICIAN ASSISTANT

## 2022-09-19 PROCEDURE — 1160F RVW MEDS BY RX/DR IN RCRD: CPT | Performed by: PHYSICIAN ASSISTANT

## 2022-09-19 RX ORDER — METHIMAZOLE 5 MG/1
2.5 TABLET ORAL EVERY OTHER DAY
Qty: 24 TABLET | Refills: 3 | Status: SHIPPED | OUTPATIENT
Start: 2022-09-19

## 2022-09-19 NOTE — PROGRESS NOTES
Darian Burleson 74 y o  female MRN: [id number]    Encounter: [id number]      Assessment/Plan     Assessment: This is a 74y o -year-old female with hyperthyroidism due to Graves disease  Plan:  1  Hyperthyroidism:  TSH remains on the high end of normal, but free T3 and free T4 are normal   She is currently asymptomatic  We did discuss stopping medication, but she has a lot going on right now and she is fine continuing methimazole at current dose  At this time since things have been going well, will repeat lab work in 6 months and have her follow-up in 1 year with lab work completed prior to visit      2  Graves disease:  No evidence of Graves ophthalmopathy  CC:  Hypothyroid follow-up    History of Present Illness     HPI:  Sania Archer Y 19 DVND old female with hyperthyroidism due to Graves disease for follow-up visit  Uma Grullon was diagnosed with Graves disease in 2016 when she was hospitalized for atrial fibrillation   She was placed on methimazole until February 2018 when she was thought to be in remission   Unfortunately, her hyperthyroidism recurred after methimazole was discontinued in it was restarted in late April 2018       She is currently taking methimazole 5 mg half tablet or 2 5 mg every other day and metoprolol 25 mg daily   She generally feels well with occasional fatigue episodes   She has unchanged dry skin but no brittle nails or hair loss   She has had no recent Afib events and denies palpitations   She denies heat or cold intolerance, diarrhea constipation, anxiety or depression, tremors, or insomnia    She has no weight changes   She denies diplopia   She denies compressive thyroid symptoms or difficulties with swallowing  She has been under lot of stress as there has been a lot going on since her 's passing in January 2022  Otherwise she is doing quite well today without any concerns or questions      Review of Systems   Constitutional: Positive for fatigue ( occasional)  Negative for activity change, appetite change, diaphoresis and unexpected weight change  HENT: Negative for sore throat, trouble swallowing and voice change  Eyes: Negative for visual disturbance  Respiratory: Negative for chest tightness and shortness of breath  Cardiovascular: Negative for chest pain, palpitations and leg swelling  Gastrointestinal: Negative for abdominal pain, constipation and diarrhea  Endocrine: Negative for cold intolerance, heat intolerance, polydipsia, polyphagia and polyuria  Skin: Negative for rash  Dry skin   Neurological: Negative for dizziness, tremors, light-headedness, numbness and headaches  Hematological: Negative for adenopathy  Psychiatric/Behavioral: Negative for dysphoric mood and sleep disturbance  The patient is not nervous/anxious          Historical Information   Past Medical History:   Diagnosis Date    Atrial fibrillation ([facility] [address] )     Hypertension     with hyperthyroidism    Osteoarthritis      Past Surgical History:   Procedure Laterality Date    TUBAL LIGATION Bilateral      Social History   Social History     Substance and Sexual Activity   Alcohol Use No     Social History     Substance and Sexual Activity   Drug Use No     Social History     Tobacco Use   Smoking Status Current Every Day Smoker    Packs/day: 0 50    Years: 0 00    Pack years: 0 00    Types: Cigarettes   Smokeless Tobacco Never Used     Family History:   Family History   Problem Relation Age of Onset    Thyroid disease unspecified Mother     No Known Problems Father     Hypothyroidism Sister     Hashimoto's thyroiditis Sister     Goiter Sister     Rheum arthritis Brother     Thyroid disease unspecified Sister     Thyroid disease unspecified Sister     No Known Problems Brother     No Known Problems Brother     No Known Problems Son     No Known Problems Daughter        Meds/Allergies   Current Outpatient Medications   Medication Sig Dispense Refill    b complex vitamins tablet Take 1 tablet by mouth daily      lisinopril (ZESTRIL) 2 5 mg tablet Take by mouth daily   4    methimazole (TAPAZOLE) 5 mg tablet Take 0 5 tablets (2 5 mg total) by mouth every other day 24 tablet 3    metoprolol succinate (TOPROL-XL) 25 mg 24 hr tablet Take by mouth daily    4    XARELTO 20 MG tablet Take 20 mg by mouth daily with breakfast    4     No current facility-administered medications for this visit  Allergies   Allergen Reactions    Sulfamethoxazole-Trimethoprim        Objective   Vitals: Blood pressure 124/80, pulse 100, height 5' 4" (1 626 m), weight 64 3 kg (141 lb 12 8 oz)  Physical Exam  Vitals and nursing note reviewed  Constitutional:       General: She is not in acute distress  HENT:      Head: Normocephalic and atraumatic  Eyes:      Pupils: Pupils are equal, round, and reactive to light  Cardiovascular:      Rate and Rhythm: Normal rate and regular rhythm  Heart sounds: No murmur heard  Pulmonary:      Effort: Pulmonary effort is normal  No respiratory distress  Breath sounds: Normal breath sounds  No wheezing  Musculoskeletal:         General: No swelling  Cervical back: Normal range of motion  Lymphadenopathy:      Cervical: No cervical adenopathy  Neurological:      Mental Status: She is alert and oriented to person, place, and time  Sensory: No sensory deficit  Psychiatric:         Mood and Affect: Mood normal          Behavior: Behavior normal          Thought Content: Thought content normal          The history was obtained from the review of the chart, patient      Lab Results:   Lab Results   Component Value Date/Time    Free t4 1 16 08/25/2022 07:37 AM    Free t4 1 13 06/14/2022 07:36 AM    Free t4 1 08 03/07/2022 08:32 AM       Imaging Studies:   Results for orders placed during the hospital encounter of 12/07/16    US thyroid    Impression  Enlarged, heterogeneous, and hypervascular thyroid gland without evidence of discrete nodule or mass  Workstation performed: [id number]      I have personally reviewed pertinent reports  Portions of the record may have been created with voice recognition software  Occasional wrong word or "sound a like" substitutions may have occurred due to the inherent limitations of voice recognition software  Read the chart carefully and recognize, using context, where substitutions have occurred

## 2022-09-19 NOTE — PATIENT INSTRUCTIONS
Continue methimazole 2 5 mg every other day  Get lab work in 6 months  Contact the office if there is any change in symptoms  Follow-up in 1 year with lab work completed prior to visit

## 2023-01-01 ENCOUNTER — APPOINTMENT (EMERGENCY)
Dept: CT IMAGING | Facility: HOSPITAL | Age: 73
DRG: 689 | End: 2023-01-01
Payer: COMMERCIAL

## 2023-01-01 ENCOUNTER — TELEPHONE (OUTPATIENT)
Dept: FAMILY MEDICINE CLINIC | Facility: CLINIC | Age: 73
End: 2023-01-01

## 2023-01-01 ENCOUNTER — HOSPITAL ENCOUNTER (INPATIENT)
Facility: HOSPITAL | Age: 73
LOS: 2 days | DRG: 689 | End: 2023-06-04
Attending: EMERGENCY MEDICINE | Admitting: GENERAL PRACTICE
Payer: COMMERCIAL

## 2023-01-01 ENCOUNTER — APPOINTMENT (EMERGENCY)
Dept: RADIOLOGY | Facility: HOSPITAL | Age: 73
DRG: 689 | End: 2023-01-01
Payer: COMMERCIAL

## 2023-01-01 ENCOUNTER — PATIENT OUTREACH (OUTPATIENT)
Dept: FAMILY MEDICINE CLINIC | Facility: CLINIC | Age: 73
End: 2023-01-01

## 2023-01-01 ENCOUNTER — APPOINTMENT (INPATIENT)
Dept: CT IMAGING | Facility: HOSPITAL | Age: 73
DRG: 689 | End: 2023-01-01
Payer: COMMERCIAL

## 2023-01-01 ENCOUNTER — TELEMEDICINE (OUTPATIENT)
Dept: FAMILY MEDICINE CLINIC | Facility: CLINIC | Age: 73
End: 2023-01-01

## 2023-01-01 VITALS
TEMPERATURE: 97.6 F | OXYGEN SATURATION: 96 % | HEART RATE: 79 BPM | RESPIRATION RATE: 17 BRPM | SYSTOLIC BLOOD PRESSURE: 111 MMHG | DIASTOLIC BLOOD PRESSURE: 63 MMHG

## 2023-01-01 VITALS
DIASTOLIC BLOOD PRESSURE: 70 MMHG | WEIGHT: 120 LBS | BODY MASS INDEX: 20.6 KG/M2 | OXYGEN SATURATION: 95 % | SYSTOLIC BLOOD PRESSURE: 105 MMHG | HEART RATE: 84 BPM | TEMPERATURE: 98 F

## 2023-01-01 DIAGNOSIS — R16.0 LIVER MASSES: ICD-10-CM

## 2023-01-01 DIAGNOSIS — R41.82 ALTERED MENTAL STATUS: Primary | ICD-10-CM
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
## 2023-01-01 DIAGNOSIS — I48.91 ATRIAL FIBRILLATION, UNSPECIFIED TYPE (HCC): Primary | ICD-10-CM

## 2023-01-01 DIAGNOSIS — R77.8 ELEVATED TROPONIN: ICD-10-CM

## 2023-01-01 DIAGNOSIS — R53.1 WEAKNESS GENERALIZED: ICD-10-CM

## 2023-01-01 DIAGNOSIS — I42.9 CARDIOMYOPATHY, UNSPECIFIED TYPE (HCC): ICD-10-CM

## 2023-01-01 DIAGNOSIS — K59.00 CONSTIPATION, UNSPECIFIED CONSTIPATION TYPE: ICD-10-CM

## 2023-01-01 DIAGNOSIS — I50.9 ACUTE CONGESTIVE HEART FAILURE, UNSPECIFIED HEART FAILURE TYPE (HCC): ICD-10-CM

## 2023-01-01 DIAGNOSIS — E05.90 HYPERTHYROIDISM: ICD-10-CM

## 2023-01-01 DIAGNOSIS — R19.00 PELVIC MASS: ICD-10-CM

## 2023-01-01 DIAGNOSIS — R26.81 GAIT INSTABILITY: ICD-10-CM

## 2023-01-01 DIAGNOSIS — G93.40 ACUTE ENCEPHALOPATHY: ICD-10-CM

## 2023-01-01 DIAGNOSIS — N39.0 UTI (URINARY TRACT INFECTION): ICD-10-CM
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
## 2023-01-01 LAB
2HR DELTA HS TROPONIN: 1 NG/L
4HR DELTA HS TROPONIN: 10 NG/L
ABO GROUP BLD BPU: NORMAL
ABO GROUP BLD: NORMAL
ABO GROUP BLD: NORMAL
ALBUMIN SERPL BCP-MCNC: 2.5 G/DL (ref 3.5–5)
ALBUMIN SERPL BCP-MCNC: 2.7 G/DL (ref 3.5–5)
ALP SERPL-CCNC: 276 U/L (ref 34–104)
ALP SERPL-CCNC: 305 U/L (ref 34–104)
ALT SERPL W P-5'-P-CCNC: 25 U/L (ref 7–52)
ALT SERPL W P-5'-P-CCNC: 26 U/L (ref 7–52)
AMMONIA PLAS-SCNC: 35 UMOL/L (ref 18–72)
ANION GAP SERPL CALCULATED.3IONS-SCNC: 10 MMOL/L (ref 4–13)
ANION GAP SERPL CALCULATED.3IONS-SCNC: 11 MMOL/L (ref 4–13)
APTT PPP: 51 SECONDS (ref 23–37)
AST SERPL W P-5'-P-CCNC: 46 U/L (ref 13–39)
AST SERPL W P-5'-P-CCNC: 51 U/L (ref 13–39)
ATRIAL RATE: 77 BPM
BACTERIA UR CULT: NORMAL
BACTERIA UR QL AUTO: ABNORMAL /HPF
BASOPHILS # BLD AUTO: 0.1 THOUSANDS/ÂΜL (ref 0–0.1)
BASOPHILS # BLD AUTO: 0.11 THOUSANDS/ÂΜL (ref 0–0.1)
BASOPHILS NFR BLD AUTO: 1 % (ref 0–1)
BASOPHILS NFR BLD AUTO: 1 % (ref 0–1)
BILIRUB SERPL-MCNC: 2.02 MG/DL (ref 0.2–1)
BILIRUB SERPL-MCNC: 2.55 MG/DL (ref 0.2–1)
BILIRUB UR QL STRIP: NEGATIVE
BLD GP AB SCN SERPL QL: NEGATIVE
BNP SERPL-MCNC: 304 PG/ML (ref 0–100)
BPU ID: NORMAL
BUN SERPL-MCNC: 16 MG/DL (ref 5–25)
BUN SERPL-MCNC: 16 MG/DL (ref 5–25)
CALCIUM ALBUM COR SERPL-MCNC: 11.2 MG/DL (ref 8.3–10.1)
CALCIUM ALBUM COR SERPL-MCNC: 11.3 MG/DL (ref 8.3–10.1)
CALCIUM SERPL-MCNC: 10 MG/DL (ref 8.4–10.2)
CALCIUM SERPL-MCNC: 10.3 MG/DL (ref 8.4–10.2)
CARDIAC TROPONIN I PNL SERPL HS: 57 NG/L
CARDIAC TROPONIN I PNL SERPL HS: 58 NG/L
CARDIAC TROPONIN I PNL SERPL HS: 67 NG/L
CHLORIDE SERPL-SCNC: 95 MMOL/L (ref 96–108)
CHLORIDE SERPL-SCNC: 99 MMOL/L (ref 96–108)
CLARITY UR: ABNORMAL
CO2 SERPL-SCNC: 25 MMOL/L (ref 21–32)
CO2 SERPL-SCNC: 27 MMOL/L (ref 21–32)
COLOR UR: YELLOW
CREAT SERPL-MCNC: 0.56 MG/DL (ref 0.6–1.3)
CREAT SERPL-MCNC: 0.59 MG/DL (ref 0.6–1.3)
EOSINOPHIL # BLD AUTO: 0.15 THOUSAND/ÂΜL (ref 0–0.61)
EOSINOPHIL # BLD AUTO: 0.16 THOUSAND/ÂΜL (ref 0–0.61)
EOSINOPHIL NFR BLD AUTO: 1 % (ref 0–6)
EOSINOPHIL NFR BLD AUTO: 1 % (ref 0–6)
ERYTHROCYTE [DISTWIDTH] IN BLOOD BY AUTOMATED COUNT: 19 % (ref 11.6–15.1)
ERYTHROCYTE [DISTWIDTH] IN BLOOD BY AUTOMATED COUNT: 19.7 % (ref 11.6–15.1)
GFR SERPL CREATININE-BSD FRML MDRD: 90 ML/MIN/1.73SQ M
GFR SERPL CREATININE-BSD FRML MDRD: 92 ML/MIN/1.73SQ M
GLUCOSE SERPL-MCNC: 63 MG/DL (ref 65–140)
GLUCOSE SERPL-MCNC: 68 MG/DL (ref 65–140)
GLUCOSE SERPL-MCNC: 69 MG/DL (ref 65–140)
GLUCOSE UR STRIP-MCNC: NEGATIVE MG/DL
HCT VFR BLD AUTO: 31.6 % (ref 34.8–46.1)
HCT VFR BLD AUTO: 32.1 % (ref 34.8–46.1)
HGB BLD-MCNC: 10.5 G/DL (ref 11.5–15.4)
HGB BLD-MCNC: 10.9 G/DL (ref 11.5–15.4)
HGB UR QL STRIP.AUTO: ABNORMAL
IMM GRANULOCYTES # BLD AUTO: 0.22 THOUSAND/UL (ref 0–0.2)
IMM GRANULOCYTES # BLD AUTO: 0.28 THOUSAND/UL (ref 0–0.2)
IMM GRANULOCYTES NFR BLD AUTO: 1 % (ref 0–2)
IMM GRANULOCYTES NFR BLD AUTO: 1 % (ref 0–2)
INR PPP: 2.56 (ref 0.84–1.19)
KETONES UR STRIP-MCNC: ABNORMAL MG/DL
LACTATE SERPL-SCNC: 1.4 MMOL/L (ref 0.5–2)
LEUKOCYTE ESTERASE UR QL STRIP: ABNORMAL
LYMPHOCYTES # BLD AUTO: 0.91 THOUSANDS/ÂΜL (ref 0.6–4.47)
LYMPHOCYTES # BLD AUTO: 0.96 THOUSANDS/ÂΜL (ref 0.6–4.47)
LYMPHOCYTES NFR BLD AUTO: 4 % (ref 14–44)
LYMPHOCYTES NFR BLD AUTO: 4 % (ref 14–44)
MAGNESIUM SERPL-MCNC: 2.1 MG/DL (ref 1.9–2.7)
MCH RBC QN AUTO: 30.5 PG (ref 26.8–34.3)
MCH RBC QN AUTO: 31.4 PG (ref 26.8–34.3)
MCHC RBC AUTO-ENTMCNC: 33.2 G/DL (ref 31.4–37.4)
MCHC RBC AUTO-ENTMCNC: 34 G/DL (ref 31.4–37.4)
MCV RBC AUTO: 92 FL (ref 82–98)
MCV RBC AUTO: 93 FL (ref 82–98)
MONOCYTES # BLD AUTO: 1.5 THOUSAND/ÂΜL (ref 0.17–1.22)
MONOCYTES # BLD AUTO: 1.62 THOUSAND/ÂΜL (ref 0.17–1.22)
MONOCYTES NFR BLD AUTO: 7 % (ref 4–12)
MONOCYTES NFR BLD AUTO: 7 % (ref 4–12)
NEUTROPHILS # BLD AUTO: 19.1 THOUSANDS/ÂΜL (ref 1.85–7.62)
NEUTROPHILS # BLD AUTO: 20.76 THOUSANDS/ÂΜL (ref 1.85–7.62)
NEUTS SEG NFR BLD AUTO: 86 % (ref 43–75)
NEUTS SEG NFR BLD AUTO: 86 % (ref 43–75)
NITRITE UR QL STRIP: NEGATIVE
NON-SQ EPI CELLS URNS QL MICRO: ABNORMAL /HPF
NRBC BLD AUTO-RTO: 0 /100 WBCS
NRBC BLD AUTO-RTO: 0 /100 WBCS
P AXIS: 69 DEGREES
PH UR STRIP.AUTO: 5.5 [PH]
PLATELET # BLD AUTO: 21 THOUSANDS/UL (ref 149–390)
PLATELET # BLD AUTO: 25 THOUSANDS/UL (ref 149–390)
POTASSIUM SERPL-SCNC: 3.5 MMOL/L (ref 3.5–5.3)
POTASSIUM SERPL-SCNC: 3.9 MMOL/L (ref 3.5–5.3)
PR INTERVAL: 200 MS
PROT SERPL-MCNC: 5 G/DL (ref 6.4–8.4)
PROT SERPL-MCNC: 5.2 G/DL (ref 6.4–8.4)
PROT UR STRIP-MCNC: ABNORMAL MG/DL
PROTHROMBIN TIME: 27.7 SECONDS (ref 11.6–14.5)
QRS AXIS: 57 DEGREES
QRSD INTERVAL: 94 MS
QT INTERVAL: 308 MS
QTC INTERVAL: 348 MS
RBC # BLD AUTO: 3.44 MILLION/UL (ref 3.81–5.12)
RBC # BLD AUTO: 3.47 MILLION/UL (ref 3.81–5.12)
RBC #/AREA URNS AUTO: ABNORMAL /HPF
RH BLD: NEGATIVE
RH BLD: NEGATIVE
SODIUM SERPL-SCNC: 132 MMOL/L (ref 135–147)
SODIUM SERPL-SCNC: 135 MMOL/L (ref 135–147)
SP GR UR STRIP.AUTO: 1.02 (ref 1–1.03)
SPECIMEN EXPIRATION DATE: NORMAL
T WAVE AXIS: 45 DEGREES
T4 FREE SERPL-MCNC: 0.6 NG/DL (ref 0.61–1.12)
TSH SERPL DL<=0.05 MIU/L-ACNC: 11.36 UIU/ML (ref 0.45–4.5)
UNIT DISPENSE STATUS: NORMAL
UNIT PRODUCT CODE: NORMAL
UNIT PRODUCT VOLUME: 300 ML
UNIT RH: NORMAL
UROBILINOGEN UR STRIP-ACNC: 4 MG/DL
VENTRICULAR RATE: 77 BPM
WBC # BLD AUTO: 22.04 THOUSAND/UL (ref 4.31–10.16)
WBC # BLD AUTO: 23.83 THOUSAND/UL (ref 4.31–10.16)
WBC #/AREA URNS AUTO: ABNORMAL /HPF

## 2023-01-01 PROCEDURE — 87086 URINE CULTURE/COLONY COUNT: CPT | Performed by: EMERGENCY MEDICINE

## 2023-01-01 PROCEDURE — 99239 HOSP IP/OBS DSCHRG MGMT >30: CPT | Performed by: HOSPITALIST

## 2023-01-01 PROCEDURE — 86901 BLOOD TYPING SEROLOGIC RH(D): CPT | Performed by: EMERGENCY MEDICINE

## 2023-01-01 PROCEDURE — 80053 COMPREHEN METABOLIC PANEL: CPT

## 2023-01-01 PROCEDURE — 87040 BLOOD CULTURE FOR BACTERIA: CPT | Performed by: EMERGENCY MEDICINE

## 2023-01-01 PROCEDURE — 86900 BLOOD TYPING SEROLOGIC ABO: CPT | Performed by: EMERGENCY MEDICINE

## 2023-01-01 PROCEDURE — 99232 SBSQ HOSP IP/OBS MODERATE 35: CPT

## 2023-01-01 PROCEDURE — 85730 THROMBOPLASTIN TIME PARTIAL: CPT | Performed by: EMERGENCY MEDICINE

## 2023-01-01 PROCEDURE — 99223 1ST HOSP IP/OBS HIGH 75: CPT | Performed by: HOSPITALIST

## 2023-01-01 PROCEDURE — 85025 COMPLETE CBC W/AUTO DIFF WBC: CPT | Performed by: EMERGENCY MEDICINE

## 2023-01-01 PROCEDURE — G1004 CDSM NDSC: HCPCS

## 2023-01-01 PROCEDURE — 82140 ASSAY OF AMMONIA: CPT | Performed by: EMERGENCY MEDICINE

## 2023-01-01 PROCEDURE — 82948 REAGENT STRIP/BLOOD GLUCOSE: CPT

## 2023-01-01 PROCEDURE — 71250 CT THORAX DX C-: CPT

## 2023-01-01 PROCEDURE — 81001 URINALYSIS AUTO W/SCOPE: CPT | Performed by: EMERGENCY MEDICINE

## 2023-01-01 PROCEDURE — 85025 COMPLETE CBC W/AUTO DIFF WBC: CPT

## 2023-01-01 PROCEDURE — 99223 1ST HOSP IP/OBS HIGH 75: CPT | Performed by: INTERNAL MEDICINE

## 2023-01-01 PROCEDURE — 84484 ASSAY OF TROPONIN QUANT: CPT | Performed by: EMERGENCY MEDICINE

## 2023-01-01 PROCEDURE — 83735 ASSAY OF MAGNESIUM: CPT | Performed by: EMERGENCY MEDICINE

## 2023-01-01 PROCEDURE — 36415 COLL VENOUS BLD VENIPUNCTURE: CPT | Performed by: EMERGENCY MEDICINE

## 2023-01-01 PROCEDURE — NC001 PR NO CHARGE: Performed by: OBSTETRICS & GYNECOLOGY

## 2023-01-01 PROCEDURE — 30233R1 TRANSFUSION OF NONAUTOLOGOUS PLATELETS INTO PERIPHERAL VEIN, PERCUTANEOUS APPROACH: ICD-10-PCS | Performed by: HOSPITALIST

## 2023-01-01 PROCEDURE — 93005 ELECTROCARDIOGRAM TRACING: CPT

## 2023-01-01 PROCEDURE — P9037 PLATE PHERES LEUKOREDU IRRAD: HCPCS

## 2023-01-01 PROCEDURE — 83605 ASSAY OF LACTIC ACID: CPT | Performed by: EMERGENCY MEDICINE

## 2023-01-01 PROCEDURE — 80053 COMPREHEN METABOLIC PANEL: CPT | Performed by: EMERGENCY MEDICINE

## 2023-01-01 PROCEDURE — 74177 CT ABD & PELVIS W/CONTRAST: CPT

## 2023-01-01 PROCEDURE — 86850 RBC ANTIBODY SCREEN: CPT | Performed by: EMERGENCY MEDICINE

## 2023-01-01 PROCEDURE — 70450 CT HEAD/BRAIN W/O DYE: CPT

## 2023-01-01 PROCEDURE — 84439 ASSAY OF FREE THYROXINE: CPT

## 2023-01-01 PROCEDURE — 72220 X-RAY EXAM SACRUM TAILBONE: CPT

## 2023-01-01 PROCEDURE — 83880 ASSAY OF NATRIURETIC PEPTIDE: CPT | Performed by: EMERGENCY MEDICINE

## 2023-01-01 PROCEDURE — 96365 THER/PROPH/DIAG IV INF INIT: CPT

## 2023-01-01 PROCEDURE — 84443 ASSAY THYROID STIM HORMONE: CPT | Performed by: EMERGENCY MEDICINE

## 2023-01-01 PROCEDURE — 99451 NTRPROF PH1/NTRNET/EHR 5/>: CPT | Performed by: RADIOLOGY

## 2023-01-01 PROCEDURE — 93010 ELECTROCARDIOGRAM REPORT: CPT | Performed by: INTERNAL MEDICINE

## 2023-01-01 PROCEDURE — 85610 PROTHROMBIN TIME: CPT | Performed by: EMERGENCY MEDICINE

## 2023-01-01 PROCEDURE — 71045 X-RAY EXAM CHEST 1 VIEW: CPT

## 2023-01-01 PROCEDURE — 99223 1ST HOSP IP/OBS HIGH 75: CPT | Performed by: OBSTETRICS & GYNECOLOGY

## 2023-01-01 PROCEDURE — 99285 EMERGENCY DEPT VISIT HI MDM: CPT

## 2023-01-01 RX ORDER — LISINOPRIL 2.5 MG/1
2.5 TABLET ORAL DAILY
Status: DISCONTINUED | OUTPATIENT
Start: 2023-01-01 | End: 2023-01-01 | Stop reason: HOSPADM

## 2023-01-01 RX ORDER — METHIMAZOLE 5 MG/1
2.5 TABLET ORAL EVERY OTHER DAY
Status: DISCONTINUED | OUTPATIENT
Start: 2023-01-01 | End: 2023-01-01 | Stop reason: HOSPADM

## 2023-01-01 RX ORDER — ACETAMINOPHEN 325 MG/1
650 TABLET ORAL EVERY 6 HOURS PRN
Status: DISCONTINUED | OUTPATIENT
Start: 2023-01-01 | End: 2023-01-01 | Stop reason: HOSPADM

## 2023-01-01 RX ORDER — MIRTAZAPINE 15 MG/1
7.5 TABLET, FILM COATED ORAL
Status: DISCONTINUED | OUTPATIENT
Start: 2023-01-01 | End: 2023-01-01 | Stop reason: HOSPADM

## 2023-01-01 RX ORDER — METOPROLOL SUCCINATE 25 MG/1
25 TABLET, EXTENDED RELEASE ORAL DAILY
Status: DISCONTINUED | OUTPATIENT
Start: 2023-01-01 | End: 2023-01-01 | Stop reason: HOSPADM

## 2023-01-01 RX ORDER — OXYCODONE HYDROCHLORIDE 5 MG/1
5 TABLET ORAL EVERY 6 HOURS PRN
Status: DISCONTINUED | OUTPATIENT
Start: 2023-01-01 | End: 2023-01-01 | Stop reason: HOSPADM

## 2023-01-01 RX ORDER — FUROSEMIDE 40 MG/1
TABLET ORAL
COMMUNITY
Start: 2023-01-01 | End: 2023-01-01

## 2023-01-01 RX ORDER — CALCIUM CARBONATE 500 MG/1
1000 TABLET, CHEWABLE ORAL DAILY PRN
Status: CANCELLED | OUTPATIENT
Start: 2023-01-01

## 2023-01-01 RX ORDER — ONDANSETRON 2 MG/ML
4 INJECTION INTRAMUSCULAR; INTRAVENOUS EVERY 6 HOURS PRN
Status: CANCELLED | OUTPATIENT
Start: 2023-01-01

## 2023-01-01 RX ORDER — SODIUM CHLORIDE 9 MG/ML
75 INJECTION, SOLUTION INTRAVENOUS CONTINUOUS
Status: DISCONTINUED | OUTPATIENT
Start: 2023-01-01 | End: 2023-01-01 | Stop reason: HOSPADM

## 2023-01-01 RX ORDER — AMIODARONE HYDROCHLORIDE 200 MG/1
TABLET ORAL
COMMUNITY
Start: 2023-01-01 | End: 2023-01-01

## 2023-01-01 RX ORDER — AMIODARONE HYDROCHLORIDE 200 MG/1
200 TABLET ORAL
Status: DISCONTINUED | OUTPATIENT
Start: 2023-01-01 | End: 2023-01-01 | Stop reason: HOSPADM

## 2023-01-01 RX ORDER — MIRTAZAPINE 7.5 MG/1
TABLET, FILM COATED ORAL
COMMUNITY
Start: 2023-01-01 | End: 2023-01-01

## 2023-01-01 RX ADMIN — AMIODARONE HYDROCHLORIDE 200 MG: 200 TABLET ORAL at 08:03

## 2023-01-01 RX ADMIN — SODIUM CHLORIDE 75 ML/HR: 0.9 INJECTION, SOLUTION INTRAVENOUS at 03:02

## 2023-01-01 RX ADMIN — SODIUM CHLORIDE 1000 ML: 0.9 INJECTION, SOLUTION INTRAVENOUS at 17:18

## 2023-01-01 RX ADMIN — MIRTAZAPINE 7.5 MG: 15 TABLET, FILM COATED ORAL at 22:46

## 2023-01-01 RX ADMIN — ACETAMINOPHEN 650 MG: 325 TABLET ORAL at 16:28

## 2023-01-01 RX ADMIN — CEFTRIAXONE SODIUM 1000 MG: 10 INJECTION, POWDER, FOR SOLUTION INTRAVENOUS at 17:39

## 2023-01-01 RX ADMIN — Medication 2.5 MG: at 12:54

## 2023-01-01 RX ADMIN — IOHEXOL 85 ML: 350 INJECTION, SOLUTION INTRAVENOUS at 16:45

## 2023-01-01 RX ADMIN — CEFTRIAXONE SODIUM 1000 MG: 10 INJECTION, POWDER, FOR SOLUTION INTRAVENOUS at 08:03

## 2023-03-16 LAB
ALBUMIN SERPL-MCNC: 4 G/DL (ref 3.7–4.7)
ALBUMIN/GLOB SERPL: 1.7 {RATIO} (ref 1.2–2.2)
ALP SERPL-CCNC: 125 IU/L (ref 44–121)
ALT SERPL-CCNC: 8 IU/L (ref 0–32)
AST SERPL-CCNC: 15 IU/L (ref 0–40)
BILIRUB SERPL-MCNC: 0.4 MG/DL (ref 0–1.2)
BUN SERPL-MCNC: 5 MG/DL (ref 8–27)
BUN/CREAT SERPL: 8 (ref 12–28)
CALCIUM SERPL-MCNC: 9.2 MG/DL (ref 8.7–10.3)
CHLORIDE SERPL-SCNC: 99 MMOL/L (ref 96–106)
CO2 SERPL-SCNC: 25 MMOL/L (ref 20–29)
CREAT SERPL-MCNC: 0.61 MG/DL (ref 0.57–1)
EGFR: 94 ML/MIN/1.73
GLOBULIN SER-MCNC: 2.3 G/DL (ref 1.5–4.5)
GLUCOSE SERPL-MCNC: 92 MG/DL (ref 70–99)
POTASSIUM SERPL-SCNC: 4.2 MMOL/L (ref 3.5–5.2)
PROT SERPL-MCNC: 6.3 G/DL (ref 6–8.5)
SODIUM SERPL-SCNC: 138 MMOL/L (ref 134–144)
T3FREE SERPL-MCNC: 2.5 PG/ML (ref 2–4.4)
T4 FREE SERPL-MCNC: 1.37 NG/DL (ref 0.82–1.77)
TSH SERPL DL<=0.005 MIU/L-ACNC: 3.11 UIU/ML (ref 0.45–4.5)

## 2023-03-20 ENCOUNTER — OFFICE VISIT (OUTPATIENT)
Dept: FAMILY MEDICINE CLINIC | Facility: CLINIC | Age: 73
End: 2023-03-20

## 2023-03-20 VITALS
OXYGEN SATURATION: 97 % | DIASTOLIC BLOOD PRESSURE: 71 MMHG | BODY MASS INDEX: 23.32 KG/M2 | WEIGHT: 140 LBS | TEMPERATURE: 97.1 F | HEIGHT: 65 IN | HEART RATE: 83 BPM | SYSTOLIC BLOOD PRESSURE: 107 MMHG

## 2023-03-20 DIAGNOSIS — I48.91 ATRIAL FIBRILLATION, UNSPECIFIED TYPE (HCC): ICD-10-CM

## 2023-03-20 DIAGNOSIS — Z00.00 MEDICARE ANNUAL WELLNESS VISIT, SUBSEQUENT: Primary | ICD-10-CM

## 2023-03-20 DIAGNOSIS — R39.9 UTI SYMPTOMS: ICD-10-CM

## 2023-03-20 DIAGNOSIS — R30.0 DYSURIA: ICD-10-CM

## 2023-03-20 DIAGNOSIS — I42.9 CARDIOMYOPATHY, UNSPECIFIED TYPE (HCC): ICD-10-CM

## 2023-03-20 DIAGNOSIS — E05.00 GRAVES DISEASE: ICD-10-CM

## 2023-03-20 DIAGNOSIS — E05.90 HYPERTHYROIDISM: ICD-10-CM

## 2023-03-20 LAB
SL AMB  POCT GLUCOSE, UA: ABNORMAL
SL AMB LEUKOCYTE ESTERASE,UA: ABNORMAL
SL AMB POCT BILIRUBIN,UA: ABNORMAL
SL AMB POCT BLOOD,UA: ABNORMAL
SL AMB POCT CLARITY,UA: YELLOW
SL AMB POCT COLOR,UA: YELLOW
SL AMB POCT KETONES,UA: ABNORMAL
SL AMB POCT NITRITE,UA: POSITIVE
SL AMB POCT PH,UA: 6.5
SL AMB POCT SPECIFIC GRAVITY,UA: 1.01
SL AMB POCT URINE PROTEIN: ABNORMAL
SL AMB POCT UROBILINOGEN: ABNORMAL

## 2023-03-20 RX ORDER — CEPHALEXIN 500 MG/1
500 CAPSULE ORAL EVERY 12 HOURS SCHEDULED
Qty: 14 CAPSULE | Refills: 0 | Status: SHIPPED | OUTPATIENT
Start: 2023-03-20 | End: 2023-03-27

## 2023-03-20 NOTE — ASSESSMENT & PLAN NOTE
Follows with endocrinology  Continues on tapazole, every other day, they did discuss stopping the medication

## 2023-03-20 NOTE — ASSESSMENT & PLAN NOTE
Follows with cardiology  Just had echo  Tomorrow appointment for her yearly follow up  Continues on metoprolol and xarelto and lisinopril

## 2023-03-20 NOTE — PROGRESS NOTES
Assessment and Plan:     Problem List Items Addressed This Visit        Endocrine    Hyperthyroidism     Follows with endocrinology  Continues on tapazole, every other day, they did discuss stopping the medication         Graves disease     Follows with endocrinology  Continues on tapazole            Cardiovascular and Mediastinum    Atrial fibrillation ([facility] [address] )     Follows with cardiology  Just had echo  Tomorrow appointment for her yearly follow up  Continues on metoprolol and xarelto and lisinopril         Cardiomyopathy ([facility] [address] )     Follows with cardiology tomorrow  Echo stable            Other    Dysuria     Start keflex twice daily for 7 days, take with food  Increase water           Relevant Medications    cephalexin (KEFLEX) 500 mg capsule   Other Visit Diagnoses     Medicare annual wellness visit, subsequent    -  Primary    UTI symptoms        Relevant Orders    POCT urine dip (Completed)    Urine culture          Depression Screening and Follow-up Plan: Patient was screened for depression during today's encounter  They screened negative with a PHQ-2 score of 0  Urinary Incontinence Plan of Care: counseling topics discussed: practice Kegel (pelvic floor strengthening) exercises, use restroom every 2 hours and limit alcohol, caffeine, spicy foods, and acidic foods  Symptomatic due to current urinary tract infection  Tobacco Cessation Counseling: Tobacco cessation counseling was provided  The patient is sincerely urged to quit consumption of tobacco  She is not ready to quit tobacco  Patient refused medication  Preventive health issues were discussed with patient, and age appropriate screening tests were ordered as noted in patient's After Visit Summary  Personalized health advice and appropriate referrals for health education or preventive services given if needed, as noted in patient's After Visit Summary       History of Present Illness:     Patient presents for a [employer] Visit    Started last week with urinary frequency, urgency, no burning, + back pain (does have arthritis)   Sometimes has dysuria  Tried increasing cranberry juice seemed to help but then gotten worse over the weekend  Waking up a lot at night time to urinate  No Fevers  + diarrhea one time after drinking cranberry juice  No nausea or vomiting  Follows with endocrinology for graves hyperthyroidism  Follows with cardiology yearly for a- silvano Bautista               Patient Care Team:  Alyssia Moser as PCP - General (Nurse Practitioner)  Sherman Lopez MD as PCP - Endocrinology (Endocrinology)  Kane Painting MD (Cardiology)  Saul Gee PA-C (Endocrinology)     Review of Systems:     Review of Systems   Constitutional: Negative for chills and fever  HENT: Negative for ear pain and sore throat  Eyes: Negative for pain and visual disturbance  Respiratory: Negative for cough and shortness of breath  Cardiovascular: Positive for palpitations (at times)  Negative for chest pain  Gastrointestinal: Negative for abdominal pain and vomiting  Genitourinary: Positive for dysuria, frequency and urgency  Negative for decreased urine volume, hematuria and vaginal bleeding  Musculoskeletal: Negative for arthralgias and back pain  Skin: Negative for color change and rash  Neurological: Negative for seizures and syncope  All other systems reviewed and are negative         Problem List:     Patient Active Problem List   Diagnosis   • Hyperthyroidism   • Graves disease   • Atrial fibrillation ([facility] )   • Dysuria   • Cardiomyopathy [facility])      Past Medical and Surgical History:     Past Medical History:   Diagnosis Date   • Atrial fibrillation ([facility] [address] )    • Hypertension     with hyperthyroidism   • Osteoarthritis      Past Surgical History:   Procedure Laterality Date   • TUBAL LIGATION Bilateral       Family History:     Family History   Problem Relation Age of Onset   • Thyroid disease unspecified Mother    • No Known Problems Father    • Hypothyroidism Sister    • Hashimoto's thyroiditis Sister    • Goiter Sister    • Rheum arthritis Brother    • Thyroid disease unspecified Sister    • Thyroid disease unspecified Sister    • No Known Problems Brother    • No Known Problems Brother    • No Known Problems Son    • No Known Problems Daughter       Social History:     Social History     Socioeconomic History   • Marital status:      Spouse name: None   • Number of children: None   • Years of education: None   • Highest education level: None   Occupational History   • None   Tobacco Use   • Smoking status: Every Day     Packs/day: 0 50     Years: 35 00     Pack years: 17 50     Types: Cigarettes   • Smokeless tobacco: Never   Vaping Use   • Vaping Use: Never used   Substance and Sexual Activity   • Alcohol use: No   • Drug use: No   • Sexual activity: None   Other Topics Concern   • None   Social History Narrative   • None     Social Determinants of Health     Financial Resource Strain: Low Risk    • Difficulty of Paying Living Expenses: Not hard at all   Food Insecurity: Not on file   Transportation Needs: No Transportation Needs   • Lack of Transportation (Medical): No   • Lack of Transportation (Non-Medical):  No   Physical Activity: Not on file   Stress: Not on file   Social Connections: Not on file   Intimate Partner Violence: Not on file   Housing Stability: Not on file      Medications and Allergies:     Current Outpatient Medications   Medication Sig Dispense Refill   • b complex vitamins tablet Take 1 tablet by mouth daily     • cephalexin (KEFLEX) 500 mg capsule Take 1 capsule (500 mg total) by mouth every 12 (twelve) hours for 7 days 14 capsule 0   • lisinopril (ZESTRIL) 2 5 mg tablet Take by mouth daily   4   • methimazole (TAPAZOLE) 5 mg tablet Take 0 5 tablets (2 5 mg total) by mouth every other day 24 tablet 3   • metoprolol succinate (TOPROL-XL) 25 mg 24 hr tablet Take by mouth daily    4 • XARELTO 20 MG tablet Take 20 mg by mouth daily with breakfast    4     No current facility-administered medications for this visit  Allergies   Allergen Reactions   • Sulfamethoxazole-Trimethoprim       Immunizations: There is no immunization history on file for this patient  Health Maintenance:         Topic Date Due   • Hepatitis C Screening  Never done   • Colorectal Cancer Screening  03/20/2024 (Originally 9/17/1995)   • Breast Cancer Screening: Mammogram  03/20/2024 (Originally 9/17/1990)         Topic Date Due   • COVID-19 Vaccine (1) Never done      Medicare Screening Tests and Risk Assessments: Ward Tariq is here for her Subsequent Wellness visit  Last Medicare Wellness visit information reviewed, patient interviewed and updates made to the record today  Health Risk Assessment:   Patient rates overall health as very good  Patient feels that their physical health rating is same  Patient is satisfied with their life  Eyesight was rated as slightly worse  Hearing was rated as slightly worse  Patient feels that their emotional and mental health rating is same  Patients states they are never, rarely angry  Patient states they are sometimes unusually tired/fatigued  Pain experienced in the last 7 days has been some  Patient's pain rating has been 1/10  Patient states that she has experienced no weight loss or gain in last 6 months  Depression Screening:   PHQ-2 Score: 0      Fall Risk Screening: In the past year, patient has experienced: no history of falling in past year      Urinary Incontinence Screening:   Patient has not leaked urine accidently in the last six months  Home Safety:  Patient does not have trouble with stairs inside or outside of their home  Patient has working smoke alarms and has working carbon monoxide detector  Home safety hazards include: none  Nutrition:   Current diet is Regular  Medications:   Patient is currently taking over-the-counter supplements  OTC medications include: see medication list  Patient is able to manage medications  Activities of Daily Living (ADLs)/Instrumental Activities of Daily Living (IADLs):   Walk and transfer into and out of bed and chair?: Yes  Dress and groom yourself?: Yes    Bathe or shower yourself?: Yes    Feed yourself? Yes  Do your laundry/housekeeping?: Yes  Manage your money, pay your bills and track your expenses?: Yes  Make your own meals?: Yes    Do your own shopping?: Yes    Previous Hospitalizations:   Any hospitalizations or ED visits within the last 12 months?: No      Advance Care Planning:   Living will: No    Durable POA for healthcare: No    Advanced directive: No      PREVENTIVE SCREENINGS      Cardiovascular Screening:    General: Patient Declines      Diabetes Screening:     General: Screening Current      Colorectal Cancer Screening:     General: Patient Declines      Breast Cancer Screening:     General: Patient Declines      Cervical Cancer Screening:    General: Screening Not Indicated      Osteoporosis Screening:    General: Patient Declines      Abdominal Aortic Aneurysm (AAA) Screening:        General: Patient Declines      Lung Cancer Screening:     General: Patient Declines      Hepatitis C Screening:    General: Patient Declines    Screening, Brief Intervention, and Referral to Treatment (SBIRT)    Screening  Typical number of drinks in a day: 0  Typical number of drinks in a week: 0  Interpretation: Low risk drinking behavior  Single Item Drug Screening:  How often have you used an illegal drug (including marijuana) or a prescription medication for non-medical reasons in the past year? never    Single Item Drug Screen Score: 0  Interpretation: Negative screen for possible drug use disorder    No results found       Physical Exam:     /71   Pulse 83   Temp (!) 97 1 °F (36 2 °C) (Tympanic)   Ht 5' 5" (1 651 m)   Wt 63 5 kg (140 lb)   SpO2 97%   BMI 23 30 kg/m²     Physical Exam  Vitals and nursing note reviewed  Constitutional:       General: She is not in acute distress  Appearance: Normal appearance  She is well-developed and normal weight  HENT:      Head: Normocephalic and atraumatic  Eyes:      Conjunctiva/sclera: Conjunctivae normal    Neck:      Thyroid: No thyromegaly or thyroid tenderness  Cardiovascular:      Rate and Rhythm: Normal rate and regular rhythm  Pulses:           Radial pulses are 2+ on the right side and 2+ on the left side  Heart sounds: Normal heart sounds  No murmur heard  Pulmonary:      Effort: Pulmonary effort is normal  No respiratory distress  Breath sounds: Normal breath sounds  Abdominal:      General: Bowel sounds are normal       Palpations: Abdomen is soft  Tenderness: There is abdominal tenderness (suprapubic)  There is no right CVA tenderness or left CVA tenderness  Musculoskeletal:      Cervical back: Neck supple  Right lower leg: No edema  Left lower leg: No edema  Lymphadenopathy:      Cervical: No cervical adenopathy  Skin:     General: Skin is warm and dry  Neurological:      Mental Status: She is alert and oriented to person, place, and time     Psychiatric:         Mood and Affect: Mood normal          Behavior: Behavior normal           Baldev Sagastume

## 2023-03-20 NOTE — PATIENT INSTRUCTIONS
Medicare Preventive Visit Patient Instructions  Thank you for completing your Welcome to Medicare Visit or Medicare Annual Wellness Visit today  Your next wellness visit will be due in one year (3/20/2024)  The screening/preventive services that you may require over the next 5-10 years are detailed below  Some tests may not apply to you based off risk factors and/or age  Screening tests ordered at today's visit but not completed yet may show as past due  Also, please note that scanned in results may not display below  Preventive Screenings:  Service Recommendations Previous Testing/Comments   Colorectal Cancer Screening  * Colonoscopy    * Fecal Occult Blood Test (FOBT)/Fecal Immunochemical Test (FIT)  * Fecal DNA/Cologuard Test  * Flexible Sigmoidoscopy Age: 39-70 years old   Colonoscopy: every 10 years (may be performed more frequently if at higher risk)  OR  FOBT/FIT: every 1 year  OR  Cologuard: every 3 years  OR  Sigmoidoscopy: every 5 years  Screening may be recommended earlier than age 39 if at higher risk for colorectal cancer  Also, an individualized decision between you and your healthcare provider will decide whether screening between the ages of 74-80 would be appropriate  Colonoscopy: Not on file  FOBT/FIT: Not on file  Cologuard: Not on file  Sigmoidoscopy: Not on file          Breast Cancer Screening Age: 36 years old  Frequency: every 1-2 years  Not required if history of left and right mastectomy Mammogram: Not on file        Cervical Cancer Screening Between the ages of 21-29, pap smear recommended once every 3 years  Between the ages of 33-67, can perform pap smear with HPV co-testing every 5 years     Recommendations may differ for women with a history of total hysterectomy, cervical cancer, or abnormal pap smears in past  Pap Smear: Not on file    Screening Not Indicated   Hepatitis C Screening Once for adults born between [facility]  More frequently in patients at high risk for Hepatitis C Hep C Antibody: Not on file        Diabetes Screening 1-2 times per year if you're at risk for diabetes or have pre-diabetes Fasting glucose: No results in last 5 years (No results in last 5 years)  A1C: No results in last 5 years (No results in last 5 years)  Screening Current   Cholesterol Screening Once every 5 years if you don't have a lipid disorder  May order more often based on risk factors  Lipid panel: Not on file          Other Preventive Screenings Covered by Medicare:  1  Abdominal Aortic Aneurysm (AAA) Screening: covered once if your at risk  You're considered to be at risk if you have a family history of AAA  2  Lung Cancer Screening: covers low dose CT scan once per year if you meet all of the following conditions: (1) Age 50-69; (2) No signs or symptoms of lung cancer; (3) Current smoker or have quit smoking within the last 15 years; (4) You have a tobacco smoking history of at least 20 pack years (packs per day multiplied by number of years you smoked); (5) You get a written order from a healthcare provider  3  Glaucoma Screening: covered annually if you're considered high risk: (1) You have diabetes OR (2) Family history of glaucoma OR (3)  aged 48 and older OR (3)  American aged 72 and older  3  Osteoporosis Screening: covered every 2 years if you meet one of the following conditions: (1) You're estrogen deficient and at risk for osteoporosis based off medical history and other findings; (2) Have a vertebral abnormality; (3) On glucocorticoid therapy for more than 3 months; (4) Have primary hyperparathyroidism; (5) On osteoporosis medications and need to assess response to drug therapy  · Last bone density test (DXA Scan): Not on file  5  HIV Screening: covered annually if you're between the age of 12-76  Also covered annually if you are younger than 13 and older than 72 with risk factors for HIV infection   For pregnant patients, it is covered up to 3 times per pregnancy  Immunizations:  Immunization Recommendations   Influenza Vaccine Annual influenza vaccination during flu season is recommended for all persons aged >= 6 months who do not have contraindications   Pneumococcal Vaccine   * Pneumococcal conjugate vaccine = PCV13 (Prevnar 13), PCV15 (Vaxneuvance), PCV20 (Prevnar 20)  * Pneumococcal polysaccharide vaccine = PPSV23 (Pneumovax) Adults 25-60 years old: 1-3 doses may be recommended based on certain risk factors  Adults 72 years old: 1-2 doses may be recommended based off what pneumonia vaccine you previously received   Hepatitis B Vaccine 3 dose series if at intermediate or high risk (ex: diabetes, end stage renal disease, liver disease)   Tetanus (Td) Vaccine - COST NOT COVERED BY MEDICARE PART B Following completion of primary series, a booster dose should be given every 10 years to maintain immunity against tetanus  Td may also be given as tetanus wound prophylaxis  Tdap Vaccine - COST NOT COVERED BY MEDICARE PART B Recommended at least once for all adults  For pregnant patients, recommended with each pregnancy  Shingles Vaccine (Shingrix) - COST NOT COVERED BY MEDICARE PART B  2 shot series recommended in those aged 48 and above     Health Maintenance Due:      Topic Date Due   • Hepatitis C Screening  Never done   • Breast Cancer Screening: Mammogram  Never done   • Colorectal Cancer Screening  Never done     Immunizations Due:      Topic Date Due   • COVID-19 Vaccine (1) Never done   • Pneumococcal Vaccine: 65+ Years (1 - PCV) Never done     Advance Directives   What are advance directives? Advance directives are legal documents that state your wishes and plans for medical care  These plans are made ahead of time in case you lose your ability to make decisions for yourself  Advance directives can apply to any medical decision, such as the treatments you want, and if you want to donate organs  What are the types of advance directives?   There are many types of advance directives, and each state has rules about how to use them  You may choose a combination of any of the following:  · Living will: This is a written record of the treatment you want  You can also choose which treatments you do not want, which to limit, and which to stop at a certain time  This includes surgery, medicine, IV fluid, and tube feedings  · Durable power of  for healthcare [facility]): This is a written record that states who you want to make healthcare choices for you when you are unable to make them for yourself  This person, called a proxy, is usually a family member or a friend  You may choose more than 1 proxy  · Do not resuscitate (DNR) order:  A DNR order is used in case your heart stops beating or you stop breathing  It is a request not to have certain forms of treatment, such as CPR  A DNR order may be included in other types of advance directives  · Medical directive: This covers the care that you want if you are in a coma, near death, or unable to make decisions for yourself  You can list the treatments you want for each condition  Treatment may include pain medicine, surgery, blood transfusions, dialysis, IV or tube feedings, and a ventilator (breathing machine)  · Values history: This document has questions about your views, beliefs, and how you feel and think about life  This information can help others choose the care that you would choose  Why are advance directives important? An advance directive helps you control your care  Although spoken wishes may be used, it is better to have your wishes written down  Spoken wishes can be misunderstood, or not followed  Treatments may be given even if you do not want them  An advance directive may make it easier for your family to make difficult choices about your care  Urinary Incontinence   Urinary incontinence (UI)  is when you lose control of your bladder   UI develops because your bladder cannot store or empty urine properly  The 3 most common types of UI are stress incontinence, urge incontinence, or both  Medicines:   · May be given to help strengthen your bladder control  Report any side effects of medication to your healthcare provider  Do pelvic muscle exercises often:  Your pelvic muscles help you stop urinating  Squeeze these muscles tight for 5 seconds, then relax for 5 seconds  Gradually work up to squeezing for 10 seconds  Do 3 sets of 15 repetitions a day, or as directed  This will help strengthen your pelvic muscles and improve bladder control  Train your bladder:  Go to the bathroom at set times, such as every 2 hours, even if you do not feel the urge to go  You can also try to hold your urine when you feel the urge to go  For example, hold your urine for 5 minutes when you feel the urge to go  As that becomes easier, hold your urine for 10 minutes  Self-care:   · Keep a UI record  Write down how often you leak urine and how much you leak  Make a note of what you were doing when you leaked urine  · Drink liquids as directed  You may need to limit the amount of liquid you drink to help control your urine leakage  Do not drink any liquid right before you go to bed  Limit or do not have drinks that contain caffeine or alcohol  · Prevent constipation  Eat a variety of high-fiber foods  Good examples are high-fiber cereals, beans, vegetables, and whole-grain breads  Walking is the best way to trigger your intestines to have a bowel movement  · Exercise regularly and maintain a healthy weight  Weight loss and exercise will decrease pressure on your bladder and help you control your leakage  · Use a catheter as directed  to help empty your bladder  A catheter is a tiny, plastic tube that is put into your bladder to drain your urine  · Go to behavior therapy as directed  Behavior therapy may be used to help you learn to control your urge to urinate      Cigarette Smoking and Your Health   Risks to your health if you smoke:  Nicotine and other chemicals found in tobacco damage every cell in your body  Even if you are a light smoker, you have an increased risk for cancer, heart disease, and lung disease  If you are pregnant or have diabetes, smoking increases your risk for complications  Benefits to your health if you stop smoking:   · You decrease respiratory symptoms such as coughing, wheezing, and shortness of breath  · You reduce your risk for cancers of the lung, mouth, throat, kidney, bladder, pancreas, stomach, and cervix  If you already have cancer, you increase the benefits of chemotherapy  You also reduce your risk for cancer returning or a second cancer from developing  · You reduce your risk for heart disease, blood clots, heart attack, and stroke  · You reduce your risk for lung infections, and diseases such as pneumonia, asthma, chronic bronchitis, and emphysema  · Your circulation improves  More oxygen can be delivered to your body  If you have diabetes, you lower your risk for complications, such as kidney, artery, and eye diseases  You also lower your risk for nerve damage  Nerve damage can lead to amputations, poor vision, and blindness  · You improve your body's ability to heal and to fight infections  For more information and support to stop smoking:   · Smokefree  gov  Phone: 1- 802 - 758-5341  Web Address: www [employer]  [address] [place], [place] [address] [facility] Hospital[facility]ynn 2018 Information is for End User's use only and may not be sold, redistributed or otherwise used for commercial purposes   All illustrations and images included in CareNotes® are the copyrighted property of [employer]  or [address] [place] [address] [employer]

## 2023-03-22 LAB — BACTERIA UR CULT: ABNORMAL

## 2023-03-22 NOTE — RESULT ENCOUNTER NOTE
Urine culture is positive and sensitive to the antibiotic I placed her on   Please finish the entire course of antibiotic

## 2023-03-23 ENCOUNTER — TELEPHONE (OUTPATIENT)
Dept: FAMILY MEDICINE CLINIC | Facility: CLINIC | Age: 73
End: 2023-03-23

## 2023-03-23 NOTE — TELEPHONE ENCOUNTER
----- Message from Gilbert Menjivar sent at 3/22/2023  7:10 PM EDT -----  Urine culture is positive and sensitive to the antibiotic I placed her on   Please finish the entire course of antibiotic

## 2023-04-14 PROBLEM — N30.01 ACUTE CYSTITIS WITH HEMATURIA: Status: ACTIVE | Noted: 2023-01-01

## 2023-04-28 ENCOUNTER — VBI (OUTPATIENT)
Dept: ADMINISTRATIVE | Facility: OTHER | Age: 73
End: 2023-04-28

## 2023-06-01 PROBLEM — I50.9 ACUTE CONGESTIVE HEART FAILURE (HCC): Status: ACTIVE | Noted: 2023-01-01

## 2023-06-01 PROBLEM — R16.0 LIVER MASSES: Status: ACTIVE | Noted: 2023-01-01

## 2023-06-01 NOTE — PROGRESS NOTES
Virtual Regular Visit    Verification of patient location:    Patient is located at Home in the following state in which I hold an active license [place]      Assessment/Plan:    Problem List Items Addressed This Visit        Endocrine    Hyperthyroidism     Continue tapazole every other day         Relevant Orders    Ambulatory Referral to Social Work Care Management Program       Cardiovascular and Mediastinum    [facility] - Primary     Managed by cardiology  S/p cardioversion 5/15 in [address] [place] [address] amiodarone, metoprolol and xarelto         Relevant Orders    Ambulatory Referral to Social Work Care Management Program    Cardiomyopathy [facility])    Acute congestive heart failure ([facility] )     Wt Readings from Last 3 Encounters:   06/01/23 54 4 kg (120 lb)   04/13/23 63 kg (139 lb)   03/20/23 63 5 kg (140 lb)   daily weights  Lasix as needed for 3lb weight gain in 24 hours or 5lbs in 1 week  Close follow up with cardiology  Monitor for shortness of breath, swelling in extremities               Relevant Orders    Ambulatory Referral to Social Work Care Management Program    Walker    Wheelchair       Other    Liver masses     CT scan showed innumerable hypoattenuating hepatic masses most in keeping with metatstic disease  She opted against IR biopsy during hospitalization  Unsure if she wants outpatient follow up, wants to recover from hospitalization first          Relevant Orders    Ambulatory Referral to Social Work Care Management Program    Gait instability     Continue with home PT/OT  Use walker at all times when ambulating  Needs wheelchair for transportation in and out of doctors appointments unable to walk without dyspnea    Recent fall at home         Relevant Orders    Ambulatory Referral to [address] [place] [address]    Wheelchair    Fall at home     Continue with home PT/OT  Use walker at all times when ambulating  Needs wheelchair for transportation in and out of doctors appointments unable to walk without dyspnea  Recent fall at home         Relevant Orders    Ambulatory Referral to [address] [place] [address]    Wheelchair    Weakness generalized    Relevant Orders    Ambulatory Referral to Social Work Care Management Program    [address]    Wheelchair   Other Visit Diagnoses     Constipation, unspecified constipation type                Depression Screening and Follow-up Plan: Patient was screened for depression during today's encounter  They screened negative with a PHQ-2 score of 0  Reason for visit is   Chief Complaint   Patient presents with   • Transition of Care Management     Congested heart, afib, she feel like she is constipated and nurse says she get hernioid      • Virtual Regular Visit        Encounter provider YADY Fried    Provider located at [place] [address]  [address] [facility] [id number]      Recent Visits  Date Type Provider Dept   06/01/23 Telemedicine Y[facility]Y [facility]   Showing recent visits within past 7 days and meeting all other requirements  Today's Visits  Date Type Provider Dept   06/02/23 Telephone Benn Donath [facility]   Showing today's visits and meeting all other requirements  Future Appointments  No visits were found meeting these conditions  Showing future appointments within next 150 days and meeting all other requirements       The patient was identified by name and date of birth  Francis Pickens was informed that this is a telemedicine visit and that the visit is being conducted through the [employer]  She agrees to proceed     My office door was closed  No one else was in the room  She acknowledged consent and understanding of privacy and security of the video platform  The patient has agreed to participate and understands they can discontinue the visit at any time      Patient is aware this is a billable service  Subjective  Bruna Morse is a 74 y o  female hospital follow up   Presenting for hospital follow up admitted to [facility] on [facility] discharged to home on 5/17/23 with home care services for A-fib with RVR and Acute CHF with preserved EF  S/p cardioversion on 5/15/23 started on amiodarone  Had follow up with cardiology last week, swtiched amiodarone to once daily after 8 days instead of 2 weeks  Vitals and weight have been stable at home  Has home care, RN, OT, PT at home  US abdomen showed abnormalities on liver, had CT scan show multiple hypoattenuating masses concerning for metastatic disease  Unsure she will do liver biopsy wants to recover from hospitalization  PT came the other day for evaluation hasnt come back yet  OT came yesterday and will come again tomorrow  She did have a fall yesterday- slid down the bed  Unwitnessed  Also forgot wear commode was    Constipated, hemorrhoids possibly from straining  Daughter giving her prune juice  Didn't notice any blood with cleaning  Has leaked some stool has some genital erythema  She is using a walker for stability but needs a new one as hers is broken it has wheels on it, it was her husbands but she isnt stable with wheels, prefers skis, had a fall yesterday  Needs a wheelchair to get to appointments unable to ambulate well without dyspnea, unable to navigate uneven grounds with walker, daughter is unable to pick her up after falls without assistance  Has weakness in legs and has weight loss significant weight loss                      Past Medical History:   Diagnosis Date   • Atrial fibrillation ([address] )    • Hypertension     with hyperthyroidism   • Osteoarthritis        Past Surgical History:   Procedure Laterality Date   • TUBAL LIGATION Bilateral        Current Outpatient Medications   Medication Sig Dispense Refill   • amiodarone 200 mg tablet 200 MG ORALLY DAILY FOR ARRHYTHMIA     • b complex vitamins tablet Take 1 tablet by mouth daily     • furosemide (LASIX) 40 mg tablet 40 MG ORALLY DAILY AS NEEDED FOR WEIGHT GAIN     • lisinopril (ZESTRIL) 2 5 mg tablet Take by mouth daily   4   • methimazole (TAPAZOLE) 5 mg tablet Take 0 5 tablets (2 5 mg total) by mouth every other day 24 tablet 3   • metoprolol succinate (TOPROL-XL) 25 mg 24 hr tablet Take by mouth daily    4   • mirtazapine (REMERON) 7 5 MG tablet 7 5 MG ORALLY AT BEDTIME     • XARELTO 20 MG tablet Take 20 mg by mouth daily with breakfast    4     No current facility-administered medications for this visit  Allergies   Allergen Reactions   • Sulfamethoxazole-Trimethoprim        Review of Systems   Constitutional: Positive for activity change, appetite change (not much of an appetite), fatigue and unexpected weight change (continues to lose weight)  Negative for fever  Eyes: Negative  Respiratory: Negative for shortness of breath  Cardiovascular: Positive for leg swelling (some ankle swelling)  Negative for palpitations  Gastrointestinal: Positive for constipation and diarrhea  Negative for nausea and vomiting  Genitourinary: Negative for decreased urine volume, dysuria and urgency  Musculoskeletal: Positive for arthralgias and gait problem  Neurological: Negative for dizziness and light-headedness  Psychiatric/Behavioral: Positive for confusion and sleep disturbance  Video Exam    Vitals:    06/01/23 0948   BP: 105/70   Pulse: 84   Temp: 98 °F (36 7 °C)   SpO2: 95%   Weight: 54 4 kg (120 lb)       Physical Exam  Vitals reviewed  Constitutional:       Appearance: She is ill-appearing  Comments: Frail appearing, no acute distress  Sitting in chair, falls asleep often during visit       Pulmonary:      Effort: Pulmonary effort is normal  No respiratory distress  Neurological:      Mental Status: She is lethargic  Motor: Weakness and atrophy present        Gait: Gait abnormal       Comments: forgetful   Psychiatric: Cognition and Memory: Cognition is impaired            Visit Time  Total Visit Duration: 40 minutes
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
## 2023-06-02 PROBLEM — R53.1 WEAKNESS GENERALIZED: Status: ACTIVE | Noted: 2023-01-01

## 2023-06-02 PROBLEM — R26.81 GAIT INSTABILITY: Status: ACTIVE | Noted: 2023-01-01

## 2023-06-02 NOTE — CONSULTS
"Consultation - [place] 74 y o  female MRN: [id number]  Unit/Bed#: ED-22 Encounter: [id number]    Chief Complaint   Patient presents with   • Altered Mental Status     Patient arrived via EMS from home, they state patient has been off, dehydrated, dark urine, and irregular BM [diarrhea/constipation], had multiple falls in the last week sliding down and hitting bottom; pt denies any HS  +Xarelto  Daughter stated pt said she just \"doesn't feel right\"  Seen recently for CHF/AFIB on 5/11  Daughter reported pt has bruising on the sides/back  EMS reports BS 79       History of Present Illness   Physician Requesting Consult: Ton Huff DO  Reason for Consult / Principal Problem: abnormal CT scan   Subspeciality: GYN Oncology     HPI: Daniel Emery is a 74y o  year old P2  female with history of afib on Xarelto, Hyperthyroidism and CHF who presented to the ED today due to \"feeling off\"  Per patient and her family, she has been feeling this way for the last 2 months  \"I was sick of it today, so I came in\"  Patient's daughter is at the bedside and her grandson is present via phone contributing to patient's history  They all report that Mary Anne Worley was in her usual state until 2 months ago  She was very active, living independently and running a business  She says \"one day I woke up and I didn't feel right\"  She reports worsening fatigue, bloating, constipation, urinary incontinence, decreased appetite and weight loss  She denies any vaginal pain, vaginal bleeding, gross hematuria, or blood in her stool  She was recently admitted to [facility] with CHF and afib exacerbation  She did have imaging during that admission that was notable for hepatic lesions but pt declined IR bx    CT AP during this admission is notable for:    \"Innumerable hypoattenuating lesions throughout the liver, most of which demonstrate some peripheral enhancement and bulging of the hepatic capsule   Findings are " "highly suspicious for metastatic disease  Enlarged heterogeneous appearance to the uterus concerning for primary uterine or endometrial carcinoma with apparent invasion to the adjacent posterior bladder wall as well as extensive hepatic metastases  There is also apparent invasion of distal   ureters bilaterally with corresponding moderate bilateral hydroureteronephrosis  \"    GYN history   2 prior vaginal deliveries   Menopause at 47 y/o   \"I haven't seen a gyn since my daughter was born 48 years ago\"  She denies any postmenopausal bleeding   She reports BTL, no other surgery   Is unsure is she has ever had an abnormal pap   No family history of gynecologic or breast cancer        Inpatient consult to Gynecologic Oncology  Consult performed by: Abdullahi Espitia MD  Consult ordered by: Salina Aragon DO          Review of Systems   Constitutional: Positive for appetite change, fatigue and unexpected weight change  Negative for chills and fever  Respiratory: Negative for shortness of breath  Cardiovascular: Negative for chest pain and palpitations  Gastrointestinal: Positive for abdominal distention and constipation  Negative for blood in stool, diarrhea, nausea and vomiting  Genitourinary: Positive for frequency and pelvic pain  Negative for dysuria, hematuria, vaginal bleeding, vaginal discharge and vaginal pain  Neurological: Negative for dizziness  Historical Information   Past Medical History:   Diagnosis Date   • Atrial fibrillation ([facility] [address] )    • Hypertension     with hyperthyroidism   • Osteoarthritis      Past Surgical History:   Procedure Laterality Date   • TUBAL LIGATION Bilateral      OB History   No obstetric history on file       Family History   Problem Relation Age of Onset   • Thyroid disease unspecified Mother    • No Known Problems Father    • Hypothyroidism Sister    • Hashimoto's thyroiditis Sister    • Goiter Sister    • Rheum arthritis Brother    • Thyroid disease unspecified " Sister    • Thyroid disease unspecified Sister    • No Known Problems Brother    • No Known Problems Brother    • No Known Problems Son    • No Known Problems Daughter      Social History   Social History     Substance and Sexual Activity   Alcohol Use No     Social History     Substance and Sexual Activity   Drug Use No     Social History     Tobacco Use   Smoking Status Every Day   • Packs/day: 0 50   • Years: 35 00   • Total pack years: 17 50   • Types: Cigarettes   Smokeless Tobacco Never       Meds/Allergies   No current facility-administered medications for this encounter  Allergies   Allergen Reactions   • Sulfamethoxazole-Trimethoprim        Objective   Vitals: Blood pressure 111/63, pulse 79, temperature 97 9 °F (36 6 °C), temperature source Oral, resp  rate 16, SpO2 97 %  There is no height or weight on file to calculate BMI  No intake/output data recorded  Invasive Devices     Peripheral Intravenous Line  Duration           Peripheral IV 06/02/23 Left;Ventral (anterior) Forearm <1 day                Physical Exam  Constitutional:       Appearance: She is ill-appearing  Comments: Very frail, cachetic    Cardiovascular:      Rate and Rhythm: Normal rate and regular rhythm  Pulmonary:      Effort: Pulmonary effort is normal       Breath sounds: No wheezing, rhonchi or rales  Chest:      Chest wall: No tenderness  Abdominal:      General: There is distension  Palpations: Abdomen is soft  Tenderness: There is abdominal tenderness  There is no guarding or rebound  Comments: Mild distention   Mild tenderness diffusely    Musculoskeletal:         General: No tenderness  Cervical back: Normal range of motion  Right lower leg: No edema  Left lower leg: No edema  Skin:     Coloration: Skin is pale  Neurological:      Mental Status: She is alert        Comments: Per family, not at baseline          Lab Results:   Admission on 06/02/2023   Component Date Value • WBC 06/02/2023 22 04 (H)    • RBC 06/02/2023 3 44 (L)    • Hemoglobin 06/02/2023 10 5 (L)    • Hematocrit 06/02/2023 31 6 (L)    • MCV 06/02/2023 92    • MCH 06/02/2023 30 5    • MCHC 06/02/2023 33 2    • RDW 06/02/2023 19 0 (H)    • Platelets 20/65/9057 25 (LL)    • nRBC 06/02/2023 0    • Neutrophils Relative 06/02/2023 86 (H)    • Immat GRANS % 06/02/2023 1    • Lymphocytes Relative 06/02/2023 4 (L)    • Monocytes Relative 06/02/2023 7    • Eosinophils Relative 06/02/2023 1    • Basophils Relative 06/02/2023 1    • Neutrophils Absolute 06/02/2023 19 10 (H)    • Immature Grans Absolute 06/02/2023 0 28 (H)    • Lymphocytes Absolute 06/02/2023 0 91    • Monocytes Absolute 06/02/2023 1 50 (H)    • Eosinophils Absolute 06/02/2023 0 15    • Basophils Absolute 06/02/2023 0 10    • Protime 06/02/2023 27 7 (H)    • INR 06/02/2023 2 56 (H)    • PTT 06/02/2023 51 (H)    • ABO Grouping 06/02/2023 B    • Rh Factor 06/02/2023 Negative    • Antibody Screen 06/02/2023 Negative    • Specimen Expiration Date 06/02/2023 [id number]    • Color, UA 06/02/2023 Yellow    • Clarity, UA 06/02/2023 Turbid    • Specific Gravity, UA 06/02/2023 1 024    • pH, UA 06/02/2023 5 5    • Leukocytes, UA 06/02/2023 Large (A)    • Nitrite, UA 06/02/2023 Negative    • Protein, UA 06/02/2023 30 (1+) (A)    • Glucose, UA 06/02/2023 Negative    • Ketones, UA 06/02/2023 Trace (A)    • Urobilinogen, UA 06/02/2023 4 0 (A)    • Bilirubin, UA 06/02/2023 Negative    • Occult Blood, UA 06/02/2023 Large (A)    • Sodium 06/02/2023 132 (L)    • Potassium 06/02/2023 3 9    • Chloride 06/02/2023 95 (L)    • CO2 06/02/2023 27    • ANION GAP 06/02/2023 10    • BUN 06/02/2023 16    • Creatinine 06/02/2023 0 59 (L)    • Glucose 06/02/2023 69    • Calcium 06/02/2023 10 3 (H)    • Corrected Calcium 06/02/2023 11 3 (H)    • AST 06/02/2023 51 (H)    • ALT 06/02/2023 26    • Alkaline Phosphatase 06/02/2023 305 (H)    • Total Protein 06/02/2023 5 2 (L)    • Albumin 06/02/2023 2 7 (L)    • Total Bilirubin 06/02/2023 2 55 (H)    • eGFR 06/02/2023 90    • TSH 3RD GENERATON 06/02/2023 11 362 (H)    • Magnesium 06/02/2023 2 1    • hs TnI 0hr 06/02/2023 57 (H)    • BNP 06/02/2023 304 (H)    • Ammonia 06/02/2023 35    • Ventricular Rate 06/02/2023 77    • Atrial Rate 06/02/2023 77    • VA Interval 06/02/2023 200    • QRSD Interval 06/02/2023 94    • QT Interval 06/02/2023 308    • QTC Interval 06/02/2023 348    • P Axis 06/02/2023 69    • QRS Axis 06/02/2023 57    • T Wave Axis 06/02/2023 45    • POC Glucose 06/02/2023 68    • hs TnI 2hr 06/02/2023 58 (H)    • Delta 2hr hsTnI 06/02/2023 1    • LACTIC ACID 06/02/2023 1 4    • RBC, UA 06/02/2023 4-10 (A)    • WBC, UA 06/02/2023 Innumerable (A)    • Epithelial Cells 06/02/2023 Occasional    • Bacteria, UA 06/02/2023 Occasional      Imaging Studies:   CT ABDOMEN AND PELVIS WITH IV CONTRAST     INDICATION:   elevated bili  History of hepatic metastases     COMPARISON:  None      TECHNIQUE:  CT examination of the abdomen and pelvis was performed  Multiplanar 2D reformatted images were created from the source data      This examination, like all CT scans performed in the [facility], was performed utilizing techniques to minimize radiation dose exposure, including the use of iterative reconstruction and automated exposure control  Radiation dose length   product (DLP) for this visit:  498 mGy-cm     IV Contrast:  85 mL of iohexol (OMNIPAQUE)  Enteric Contrast:  Enteric contrast was not administered      FINDINGS:     ABDOMEN     LOWER CHEST:  No clinically significant abnormality identified in the visualized lower chest      LIVER/BILIARY TREE: Innumerable hypoattenuating lesions throughout the liver, most of which demonstrate some peripheral enhancement and bulging of the hepatic capsule  Findings are highly suspicious for metastatic disease      GALLBLADDER:  No calcified gallstones   No pericholecystic inflammatory change      SPLEEN:  Unremarkable      PANCREAS:  Unremarkable      ADRENAL GLANDS:  Unremarkable      KIDNEYS/URETERS: Moderate hydroureteronephrosis bilaterally with soft tissue attenuation involving the distal ureters bilaterally which appears to reflect a component of uterine/endometrial invasion of the bladder      STOMACH AND BOWEL:  There is colonic diverticulosis without evidence of acute diverticulitis      APPENDIX:  No findings to suggest appendicitis      ABDOMINOPELVIC CAVITY: Mild left pericolonic free fluid evident      VESSELS:  Unremarkable for patient's age      PELVIS     REPRODUCTIVE ORGANS: Enlarged heterogeneous appearance to the uterus concerning for region of primary carcinoma with apparent invasion to the adjacent posterior wall of the bladder well depicted on 602/101     URINARY BLADDER:  Unremarkable      ABDOMINAL WALL/INGUINAL REGIONS: Small hernia containing fat and mild free fluid      OSSEOUS STRUCTURES:  No acute fracture or destructive osseous lesion      IMPRESSION:  Enlarged heterogeneous appearance to the uterus concerning for primary uterine or endometrial carcinoma with apparent invasion to the adjacent posterior bladder wall as well as extensive hepatic metastases  There is also apparent invasion of distal   ureters bilaterally with corresponding moderate bilateral hydroureteronephrosis  Assessment/Plan     A/P: Britt Quiles is a 75 y/o P2 female now with altered mental status, thrombocytopenia and findings of enlarged uterus with liver lesions concerning for mets from primary gynecologic cancer       Uterine Mass   Findings on CT concerning for uterine carcinoma with advanced metastatic disease  Patient does not have any vaginal bleeding and no hx of PMB  Plan for discussion of goals of care, possible treatment options while in patient   Care per primary team     Evelio Lyn MD  OBGYN PGY-3  7:04 PM  6/2/2023

## 2023-06-02 NOTE — ASSESSMENT & PLAN NOTE
CT scan showed innumerable hypoattenuating hepatic masses most in keeping with metatstic disease  She opted against IR biopsy during hospitalization  Unsure if she wants outpatient follow up, wants to recover from hospitalization first

## 2023-06-02 NOTE — Clinical Note
Case was discussed with   and the patient's admission status was agreed to be Admission Status: inpatient status to the service of

## 2023-06-02 NOTE — TELEPHONE ENCOUNTER
Manish Tsang called back concerned about Tracy's mental status  She states that after discussing your telephone call with Manish Tsang, that Jaiden was unsure of who you were  Per your advise I advised Manish Tsang to take Jaiden to the ER if she felt that her mental status or health status has changed significantly  Manish Tsang verbalized understanding of this and will take her to the ER if she feels it's necessary  Manish Tsang also mentioned that this has been an observed behavior over the past few days  Asking about next steps needed for obtaining a wheelchair/walker for Jaiden  Please advise

## 2023-06-02 NOTE — QUICK NOTE
Progress Note - Triage Asssessment   Daniel Emery 74 y o  female MRN: [id number]    Time Called ( Time): 9202  Date Called: 06/02/23  Room#: ED 25  Person requesting evaluation: Dr Natasha Hernandez    Situation:     78-year-old female with past medical history of recent hospitalization for uncontrolled A-fib requiring cardioversion presents to the ED for AMS  Of note had a prior CT during hospitalization that showed liver lesions, per patient: She was told that she likely had a neoplastic process at that time, no outpatient work-up or follow-up scheduled  Patient presents today with worsening lethargy and mild confusion at home, found to have multiple lab abnormalities including thrombocytopenia, hypercalcemia, hyperbilirubinemia, mild AST elevation at 51 and UA concerning concerning for infection  CT abdomen pelvis with contrast today revealed uterine mass which invades into the posterior bladder wall with likely extensive hepatic metastases and invasion into the bilateral distal ureters  On exam patient appears mildly jaundiced with scleral icterus, abdomen is soft, nontender, nondistended, patient is hemodynamically stable on 2 L NC and is currently A/O x3  At this point the patient does not have any requirement for critical care and can be admitted SD 2 versus [facility]  I discussed the patient's CT findings with her, her daughter, and grandson over the telephone extensively and made them aware that she likely has a metastatic cancer and will require further work-up to determine if there are any possible treatment options  We discussed the patient's CODE STATUS at this time and she was very firm on maintaining a level 3 CODE STATUS and being allowed to pass peacefully if there was any significant clinical decline      Recs:  · Would recommend GI consult and work-up for possible biliary duct obstruction secondary to tumor invasion, less concern for any sort of cholangitis given lack of pain, fevers, hemodynamic instability or renal injury  · Agree with GynOnc consult  · Consider antibiotic therapy for simple UTI if patient is symptomatic, no indication currently as leukocytes in urine may be explained by tumor invasion into ureters      Interventions:   N/A         Triage Assessment:     Patient can be admitted stepdown 2 versus MedSurg     Recommendations discussed with Dr Purnell Runner and case discussed w/ Dr Adonis Georges

## 2023-06-02 NOTE — ED PROVIDER NOTES
"History  Chief Complaint   Patient presents with   • Altered Mental Status     Patient arrived via EMS from home, they state patient has been off, dehydrated, dark urine, and irregular BM [diarrhea/constipation], had multiple falls in the last week sliding down and hitting bottom; pt denies any HS  +Xarelto  Daughter stated pt said she just \"doesn't feel right\"  Seen recently for CHF/AFIB on   Daughter reported pt has bruising on the sides/back  EMS reports BS 78    20-year-old female sent in from home for altered mental status  Family reports that over the past week patient has become more confused/disoriented and has had multiple falls  Was recently admitted to the hospital for A-fib  Per chart s/p cardioversion 5/15 in [facility] and continued on amiodarone, metoprolol and xarelto  Family reports that she has become increasingly more confused and is unable to get out of bed or take care of her ADLs  Review of her weights in her chart shows a 20 pound weight loss between April and   Also in the chart I found a note about a CT scan showed innumerable hypoattenuating hepatic masses most in keeping with metatstic disease  Patient had refused IR biopsy at that time  She opted against IR biopsy during hospitalization  History provided by:  Patient   used: No    Altered Mental Status  Presenting symptoms: confusion    Severity:  Moderate  Most recent episode: Today  Episode history:  Multiple  Timing:  Intermittent  Progression:  Waxing and waning  Chronicity:  New  Context: recent change in medication and recent illness    Associated symptoms: decreased appetite, light-headedness and weakness        Prior to Admission Medications   Prescriptions Last Dose Informant Patient Reported? Taking?    XARELTO 20 MG tablet  Self Yes No   Sig: Take 20 mg by mouth daily with breakfast     amiodarone 200 mg tablet   Yes No   Si MG ORALLY DAILY FOR ARRHYTHMIA   b complex " vitamins tablet  Self Yes No   Sig: Take 1 tablet by mouth daily   furosemide (LASIX) 40 mg tablet   Yes No   Si MG ORALLY DAILY AS NEEDED FOR WEIGHT GAIN   lisinopril (ZESTRIL) 2 5 mg tablet  Self Yes No   Sig: Take by mouth daily    methimazole (TAPAZOLE) 5 mg tablet   No No   Sig: Take 0 5 tablets (2 5 mg total) by mouth every other day   metoprolol succinate (TOPROL-XL) 25 mg 24 hr tablet  Self Yes No   Sig: Take by mouth daily     mirtazapine (REMERON) 7 5 MG tablet   Yes No   Si 5 MG ORALLY AT BEDTIME      Facility-Administered Medications: None       Past Medical History:   Diagnosis Date   • Atrial fibrillation (HCC)    • Hypertension     with hyperthyroidism   • Osteoarthritis        Past Surgical History:   Procedure Laterality Date   • TUBAL LIGATION Bilateral        Family History   Problem Relation Age of Onset   • Thyroid disease unspecified Mother    • No Known Problems Father    • Hypothyroidism Sister    • Hashimoto's thyroiditis Sister    • Goiter Sister    • Rheum arthritis Brother    • Thyroid disease unspecified Sister    • Thyroid disease unspecified Sister    • No Known Problems Brother    • No Known Problems Brother    • No Known Problems Son    • No Known Problems Daughter      I have reviewed and agree with the history as documented  E-Cigarette/Vaping   • E-Cigarette Use Never User      E-Cigarette/Vaping Substances     Social History     Tobacco Use   • Smoking status: Every Day     Packs/day: 0 50     Years: 35 00     Total pack years: 17 50     Types: Cigarettes   • Smokeless tobacco: Never   Vaping Use   • Vaping Use: Never used   Substance Use Topics   • Alcohol use: No   • Drug use: No       Review of Systems   Unable to perform ROS: Mental status change   Constitutional: Positive for activity change, appetite change, decreased appetite and fatigue  Musculoskeletal: Positive for gait problem  Skin: Positive for color change (Jaundice)     Neurological: Positive for dizziness, weakness and light-headedness  Psychiatric/Behavioral: Positive for confusion  Physical Exam  Physical Exam  Vitals and nursing note reviewed  Constitutional:       General: She is not in acute distress  Appearance: She is underweight  She is ill-appearing  HENT:      Head: Normocephalic and atraumatic  Eyes:      Conjunctiva/sclera: Conjunctivae normal    Cardiovascular:      Rate and Rhythm: Normal rate and regular rhythm  Heart sounds: No murmur heard  Pulmonary:      Effort: Pulmonary effort is normal  No respiratory distress  Breath sounds: Normal breath sounds  Abdominal:      Palpations: Abdomen is soft  Tenderness: There is no abdominal tenderness  Musculoskeletal:         General: No swelling  Cervical back: Neck supple  Skin:     General: Skin is warm and dry  Capillary Refill: Capillary refill takes less than 2 seconds  Coloration: Skin is jaundiced  Neurological:      Mental Status: She is alert  She is confused     Psychiatric:         Mood and Affect: Mood normal          Vital Signs  ED Triage Vitals [06/02/23 1508]   Temperature Pulse Respirations Blood Pressure SpO2   97 9 °F (36 6 °C) 82 18 107/71 95 %      Temp Source Heart Rate Source Patient Position - Orthostatic VS BP Location FiO2 (%)   Oral Monitor Lying Right arm --      Pain Score       No Pain           Vitals:    06/03/23 1616 06/03/23 1857 06/03/23 1920 06/03/23 2105   BP: 103/64 112/65 107/58 111/63   Pulse:  86 90 79   Patient Position - Orthostatic VS:   Lying          Visual Acuity  Visual Acuity    Flowsheet Row Most Recent Value   L Pupil Size (mm) 3   R Pupil Size (mm) 3   L Pupil Shape Round   R Pupil Shape Round          ED Medications  Medications   iohexol (OMNIPAQUE) 350 MG/ML injection (SINGLE-DOSE) 100 mL (85 mL Intravenous Given 6/2/23 1645)   sodium chloride 0 9 % bolus 1,000 mL (0 mL Intravenous Stopped 6/2/23 1855)   ceftriaxone (ROCEPHIN) 1 g/50 mL in dextrose IVPB (0 mg Intravenous Stopped 6/2/23 1855)   ceftriaxone (ROCEPHIN) 1 g/50 mL in dextrose IVPB (0 mg Intravenous Stopped 6/3/23 0833)       Diagnostic Studies  Results Reviewed     Procedure Component Value Units Date/Time    Blood culture #1 [[id number]] Collected: 06/02/23 1724    Lab Status: Final result Specimen: Blood from Arm, Left Updated: 06/08/23 0001     Blood Culture No Growth After 5 Days  Blood culture #2 [[id number]] Collected: 06/02/23 1724    Lab Status: Final result Specimen: Blood from Arm, Right Updated: 06/08/23 0001     Blood Culture No Growth After 5 Days  Urine culture [[id number]] Collected: 06/02/23 1718    Lab Status: Final result Specimen: Urine, Other Updated: 06/03/23 2255     Urine Culture 30,000-39,000 cfu/ml    T4, free [[id number]]  (Abnormal) Collected: 06/02/23 1516    Lab Status: Final result Specimen: Blood from Arm, Left Updated: 06/03/23 0636     Free T4 0 60 ng/dL     HS Troponin I 4hr [[id number]]  (Abnormal) Collected: 06/02/23 2053    Lab Status: Final result Specimen: Blood from Arm, Left Updated: 06/02/23 2128     hs TnI 4hr 67 ng/L      Delta 4hr hsTnI 10 ng/L     HS Troponin I 2hr [[id number]]  (Abnormal) Collected: 06/02/23 1733    Lab Status: Final result Specimen: Blood from Line, Venous Updated: 06/02/23 1815     hs TnI 2hr 58 ng/L      Delta 2hr hsTnI 1 ng/L     Lactic acid, plasma (w/reflex if result > 2 0) [[id number]]  (Normal) Collected: 06/02/23 1724    Lab Status: Final result Specimen: Blood from Arm, Left Updated: 06/02/23 1806     LACTIC ACID 1 4 mmol/L     Narrative:      Result may be elevated if tourniquet was used during collection      Urine Microscopic [[id number]]  (Abnormal) Collected: 06/02/23 1718    Lab Status: Final result Specimen: Urine, Other Updated: 06/02/23 1740     RBC, UA 4-10 /hpf      WBC, UA Innumerable /hpf      Epithelial Cells Occasional /hpf      Bacteria, UA Occasional /hpf     UA w Reflex to Microscopic w Reflex to Culture [[id number]]  (Abnormal) Collected: 06/02/23 1718    Lab Status: Final result Specimen: Urine, Other Updated: 06/02/23 1724     Color, UA Yellow     Clarity, UA Turbid     Specific [place], UA 1 024     pH, UA 5 5     Leukocytes, UA Large     Nitrite, UA Negative     Protein, UA 30 (1+) mg/dl      Glucose, UA Negative mg/dl      Ketones, UA Trace mg/dl      Urobilinogen, UA 4 0 mg/dl      Bilirubin, UA Negative     Occult Blood, UA Large    CBC and differential [[id number]]  (Abnormal) Collected: 06/02/23 1516    Lab Status: Final result Specimen: Blood from Arm, Left Updated: 06/02/23 1627     WBC 22 04 Thousand/uL      RBC 3 44 Million/uL      Hemoglobin 10 5 g/dL      Hematocrit 31 6 %      MCV 92 fL      MCH 30 5 pg      MCHC 33 2 g/dL      RDW 19 0 %      Platelets 25 Thousands/uL      nRBC 0 /100 WBCs      Neutrophils Relative 86 %      Immat GRANS % 1 %      Lymphocytes Relative 4 %      Monocytes Relative 7 %      Eosinophils Relative 1 %      Basophils Relative 1 %      Neutrophils Absolute 19 10 Thousands/µL      Immature Grans Absolute 0 28 Thousand/uL      Lymphocytes Absolute 0 91 Thousands/µL      Monocytes Absolute 1 50 Thousand/µL      Eosinophils Absolute 0 15 Thousand/µL      Basophils Absolute 0 10 Thousands/µL     Narrative: This is an appended report  These results have been appended to a previously verified report      TSH [[id number]]  (Abnormal) Collected: 06/02/23 1516    Lab Status: Final result Specimen: Blood from Arm, Left Updated: 06/02/23 1604     TSH 3RD GENERATON 11 362 uIU/mL     HS Troponin 0hr (reflex protocol) [[id number]]  (Abnormal) Collected: 06/02/23 1516    Lab Status: Final result Specimen: Blood from Arm, Left Updated: 06/02/23 1554     hs TnI 0hr 57 ng/L     B-Type Natriuretic Peptide(BNP) [[id number]]  (Abnormal) Collected: 06/02/23 1516    Lab Status: Final result Specimen: Blood from Arm, Left Updated: 06/02/23 1553      pg/mL     Protime-INR [[id number]] (Abnormal) Collected: 06/02/23 1516    Lab Status: Final result Specimen: Blood from Arm, Left Updated: 06/02/23 1551     Protime 27 7 seconds      INR 2 56    APTT [[id number]]  (Abnormal) Collected: 06/02/23 1516    Lab Status: Final result Specimen: Blood from Arm, Left Updated: 06/02/23 1551     PTT 51 seconds     Comprehensive metabolic panel [[id number]]  (Abnormal) Collected: 06/02/23 1516    Lab Status: Final result Specimen: Blood from Arm, Left Updated: 06/02/23 1547     Sodium 132 mmol/L      Potassium 3 9 mmol/L      Chloride 95 mmol/L      CO2 27 mmol/L      ANION GAP 10 mmol/L      BUN 16 mg/dL      Creatinine 0 59 mg/dL      Glucose 69 mg/dL      Calcium 10 3 mg/dL      Corrected Calcium 11 3 mg/dL      AST 51 U/L      ALT 26 U/L      Alkaline Phosphatase 305 U/L      Total Protein 5 2 g/dL      Albumin 2 7 g/dL      Total Bilirubin 2 55 mg/dL      eGFR 90 ml/min/1 73sq m     Narrative:      [place] guidelines for Chronic Kidney Disease (CKD):   •  Stage 1 with normal or high GFR (GFR > 90 mL/min/1 73 square meters)  •  Stage 2 Mild CKD (GFR = 60-89 mL/min/1 73 square meters)  •  Stage 3A Moderate CKD (GFR = 45-59 mL/min/1 73 square meters)  •  Stage 3B Moderate CKD (GFR = 30-44 mL/min/1 73 square meters)  •  Stage 4 Severe CKD (GFR = 15-29 mL/min/1 73 square meters)  •  Stage 5 End Stage CKD (GFR <15 mL/min/1 73 square meters)  Note: GFR calculation is accurate only with a steady state creatinine    Magnesium [[id number]]  (Normal) Collected: 06/02/23 1516    Lab Status: Final result Specimen: Blood from Arm, Left Updated: 06/02/23 1547     Magnesium 2 1 mg/dL     Ammonia [[id number]]  (Normal) Collected: 06/02/23 1521    Lab Status: Final result Specimen: Blood from Arm, Left Updated: 06/02/23 1547     Ammonia 35 umol/L     Fingerstick Glucose (POCT) [[id number]]  (Normal) Collected: 06/02/23 1524    Lab Status: Final result Updated: 06/02/23 1525     POC Glucose 68 mg/dl CT chest wo contrast   Final Result by Burt Pittman MD (06/04 2556)      Numerous scattered nodules and confluent groundglass airspace opacities highly suspicious for pulmonary parenchymal metastatic disease  Small freely layering left greater than right bilateral pleural effusions with overlying compressive atelectasis  Workstation performed: [id number]         CT abdomen pelvis with contrast   Final Result by Ruddy Wiggins MD (06/02 1726)   Enlarged heterogeneous appearance to the uterus concerning for primary uterine or endometrial carcinoma with apparent invasion to the adjacent posterior bladder wall as well as extensive hepatic metastases  There is also apparent invasion of distal    ureters bilaterally with corresponding moderate bilateral hydroureteronephrosis  The study was marked in EPIC for significant notification  Workstation performed: [id number]         XR chest 1 view portable   Final Result by Arlene Alvarez MD (06/03 2910)      No acute cardiopulmonary disease  Workstation performed: [id number]         XR sacrum and coccyx   Final Result by Arlene Alvarez MD (06/03 1968)      No acute osseous abnormality  Workstation performed: [id number]         CT head without contrast   Final Result by Domingo Mistry MD (06/02 1641)      No acute intracranial abnormality                    Workstation performed: [id number]                    Procedures  ECG 12 Lead Documentation Only    Date/Time: 6/2/2023 3:27 PM    Performed by: Randall Ortega DO  Authorized by: Randall Ortega DO    Patient location:  ED  Previous ECG:     Previous ECG:  Compared to current  Rate:     ECG rate:  77    ECG rate assessment: normal    Rhythm:     Rhythm: sinus rhythm    Ectopy:     Ectopy: none    QRS:     QRS axis:  Normal  Conduction:     Conduction: normal    T waves:     T waves: non-specific               ED Course Medical Decision Making  Differential diagnosis includes but is not limited to electrolyte abnormality, dysrhythmia, hepatic encephalopathy, UTI, infectious encephalopathy, undiagnosed cancer, brain mets, anemia, hypoglycemia, intracranial bleeding,    Acute encephalopathy: acute illness or injury  Altered mental status: acute illness or injury  Elevated troponin: acute illness or injury  Pelvic mass: undiagnosed new problem with uncertain prognosis  UTI (urinary tract infection): acute illness or injury  Amount and/or Complexity of Data Reviewed  Independent Historian: caregiver     Details: Her daughter was with her and gave most of the history in particular details of prior stay at [facility] that I was unable to glean from the medical records I could get  Those are included in the HPI  Daughter freely admits her mom has not seen a GYN since she was probably born and she is in her 46s  External Data Reviewed: notes  Details: Notes from [facility] stay  Labs: ordered  Decision-making details documented in ED Course  Radiology: ordered  Decision-making details documented in ED Course  ECG/medicine tests: ordered and independent interpretation performed  Decision-making details documented in ED Course  Discussion of management or test interpretation with external provider(s): Discussed findings of CT and lab work with with both critical care and GYN  Risk  Prescription drug management  Decision regarding hospitalization  Decision not to resuscitate or to de-escalate care because of poor prognosis  Risk Details: Extensive discussion with daughter and patient about findings and poor prognosis patient DNR/DNI we will bring her to the hospital to see what treatment options she does have  Patient and family are in agreement with the plan          Disposition  Final diagnoses:   Pelvic mass   Altered mental status   Elevated troponin   Acute encephalopathy   UTI (urinary tract infection)     Time reflects when diagnosis was documented in both MDM as applicable and the Disposition within this note     Time User Action Codes Description Comment    6/2/2023  6:04 PM Sonia Mages K Add [R19 00] Pelvic mass     6/2/2023  6:51 PM Sonia Mages K Add [R41 82] Altered mental status     6/2/2023  6:51 PM Sonia Mages K Modify [R19 00] Pelvic mass     6/2/2023  6:51 PM Sonia Mages K Modify [R41 82] Altered mental status     6/2/2023  6:51 PM Sonia Mages K Add [R77 8] Elevated troponin     6/2/2023  6:52 PM Sonia Mages K Add [G93 40] Acute encephalopathy     6/2/2023  6:52 PM Sonia Mages K Add [N39 0] UTI (urinary tract infection)     6/3/2023 12:03 AM Ladalfonzo Limes Add [R16 0] Liver masses       ED Disposition     ED Disposition   Admit    Condition   Stable    Date/Time   Fri Jun 2, 2023  7:42 PM    Comment   Case was discussed with dr Enedina Kurtz  and the patient's admission status was agreed to be Admission Status: inpatient status to the service of [place] [id number]           Follow-up Information    None       Date, Time and Cause of Death    Date of Death: 6/4/23  Time of Death:  5:55 AM  Preliminary Cause of Death: Multi-organ failure with heart failure ([facility] [address] )  Entered by: Peg Zacarias[SM1 1]     Attribution     SM1 1 Robert Lucio MD 06/04/23 05:56        Discharge Medication List as of 6/4/2023 11:58 AM      STOP taking these medications       amiodarone 200 mg tablet Comments:   Reason for Stopping:         b complex vitamins tablet Comments:   Reason for Stopping:         furosemide (LASIX) 40 mg tablet Comments:   Reason for Stopping:         lisinopril (ZESTRIL) 2 5 mg tablet Comments:   Reason for Stopping:         methimazole (TAPAZOLE) 5 mg tablet Comments:   Reason for Stopping:         metoprolol succinate (TOPROL-XL) 25 mg 24 hr tablet Comments:   Reason for Stopping:         mirtazapine (REMERON) 7 5 MG tablet Comments:   Reason for Stopping:         XARELTO 20 MG tablet Comments:   Reason for Stopping:               No discharge procedures on file      PDMP Review     None          ED Provider  Electronically Signed by           Matt Brewer DO  06/11/23 6491

## 2023-06-02 NOTE — TELEPHONE ENCOUNTER
Daughter cancelled OT apt this morning  Pt fell out of bed last night and is exhausted  Rose Gomes went in to check on her this morning and she was sleeping soundly so they let her be  They think the bed is too high so they are going to try lowering the bed for her  They will check back with her on Monday

## 2023-06-02 NOTE — TELEPHONE ENCOUNTER
Fabio Bee, GM  Update mom fell again last night and has taken some steps backwards  After my son came up we decided she needs this badly  What do I need to facilitate this ASAP  Do you send an order?   Thanks Roel      Please reply back to clinical

## 2023-06-02 NOTE — PROGRESS NOTES
Follow-up call made to patient's dtr Zahra Sweeney Tyler stated there has been a change of plans  They believe patient has a UTI, has had falls, and are worried about a bleed  She called ambulance and patient is now at [facility] ED  Zahra Scott aware she can work with [facility] In[facility] Case Management Team on discharge plan to short-term rehab facility once patient has been assessed medically  Zahra Scott became tearful and stated she tried her best as patient's caregiver but knows she has declined from her previous baseline  Provided support and informed Zahra Scott that this OP SWCM will relay update to OP SWCM Blanca Chandler; will request that Law[facility] Castle check status of admission/discharge plan upon her return next week  Luda appreciative for [employer]

## 2023-06-02 NOTE — ASSESSMENT & PLAN NOTE
Wt Readings from Last 3 Encounters:   06/01/23 54 4 kg (120 lb)   04/13/23 63 kg (139 lb)   03/20/23 63 5 kg (140 lb)   daily weights  Lasix as needed for 3lb weight gain in 24 hours or 5lbs in 1 week  Close follow up with cardiology  Monitor for shortness of breath, swelling in extremities

## 2023-06-02 NOTE — PROGRESS NOTES
Referral received from PCP for Outpatient Social Work Care Manager (OP [facility]) to outreach patient/patient's dtr Lorelie Setters and assist with SNF placement for short-term rehab  Patient had recent hospitalization at [facility], discharged home with Zainabernestina Rebolledo, and patient's family is now unable to care for patient in the home setting  This OP SWCM is covering for OP [facility] Yazmin Finders  Call placed to patient's dtr Lorelie Setters (on medical communication consent form) to further discuss  Lorelie Setters answered but requested return call as she was in a doctor's office  Will outreach later this afternoon

## 2023-06-02 NOTE — ASSESSMENT & PLAN NOTE
Continue with home PT/OT  Use walker at all times when ambulating  Needs wheelchair for transportation in and out of doctors appointments unable to walk without dyspnea    Recent fall at home

## 2023-06-02 NOTE — ASSESSMENT & PLAN NOTE
Managed by cardiology  S/p cardioversion 5/15 in [address] [place] [address] amiodarone, metoprolol and xarelto

## 2023-06-02 NOTE — TELEPHONE ENCOUNTER
I put a referral to social work care management to help with placement into a SNF  Discussed with daughter Jhoan Tavares going to ER to expedite process if she has another fall, or daughter feels her conditions worsen  She understands and is working on getting a 24 hr nurse aid in the house to help with her care while they wait for placement  She did speak with her mothers insurance and she does not require ER or hospital transfer can be transferred from home to SNF once a bed is available

## 2023-06-03 PROBLEM — D72.829 LEUKOCYTOSIS: Status: ACTIVE | Noted: 2023-01-01

## 2023-06-03 PROBLEM — D69.6 THROMBOCYTOPENIA (HCC): Status: ACTIVE | Noted: 2023-01-01

## 2023-06-03 PROBLEM — E83.52 HYPERCALCEMIA: Status: ACTIVE | Noted: 2023-01-01

## 2023-06-03 PROBLEM — R19.00 PELVIC MASS: Status: ACTIVE | Noted: 2023-01-01

## 2023-06-03 NOTE — H&P
[facility]  H&P  Name: Urmila Burns 74 y o  female I MRN: [id number]  Unit/Bed#: S -01 I Date of Admission: 6/2/2023   Date of Service: 6/3/2023 I Hospital Day: 1      Assessment/Plan   * Weakness generalized  Assessment & Plan  Daughter reports the patient has had multiple falls over the past few days while trying to get to the bathroom or falling out of bed, is on xarelto with bruising on her sides and back, is unable to perform ADLs  She has been increasingly confused/disoriented over the past week  CT A/P: enlarged heterogeneous appearance to the uterus concerning for primary uterine or endometrial carcinoma with apparent invasion to the adjacent posterior bladder wall as well as extensive hepatic metastases with apparent invasion of distal ureters bilaterally with corresponding moderate bilateral hydroureteronephrosis  Ddx: 2/2 pelvic mass with probable liver metastases vs physical deconditioning vs hypothyroid vs infectious process    Recent Labs     06/02/23  1516   WBC 22 04*     Recent Labs     06/02/23  1516   HGB 10 5*     Lab Results   Component Value Date    FREET4 1 37 03/15/2023    [id number] 11 362 (H) 06/02/2023     - PT/OT eval and treat  - F/u free T4  - Fall precautions  - As below for pelvic mass    Pelvic mass  Assessment & Plan  Per chart review patient opted against IR biopsy during last hospitalization for atrial fibrillation  Per discussion with daughter, this was recommended to them and she was under the impression at-home strengthening was needed prior to biopsy  CT A/P with enlarged heterogeneous appearance to uterus concerning for primary uterine or endometrial carcinoma w/ apparent invasion to adjacent posterior bladder as well as extensive hepatic metastases with apparent invasion of distal ureters bilaterally with corresponding moderate bilateral hydroureteronephrosis      - Gyn consulted, appreciate recs  - Palliative care consulted for goals of care discussion, appreciate assistance  - Level 3 CODE status  - Consider IR biopsy pending goals of care    Atrial fibrillation [facility])  Assessment & Plan  S/p cardioversion 5/15 in [facility], patient in NSR with in 70s at the time of this admission  Home meds  amiodarone, metoprolol and xarelto  - Continue home meds  - Consider holding xarelto pending morning plt count    Gait instability  Assessment & Plan  Per family, patient has has multiple falls over the past few days while trying to get to the bathroom or falling out of bed     - Fall precautions, use walker at all times if not wheelchair  - PT/OT eval and treat    Leukocytosis  Assessment & Plan  Recent Labs     06/02/23  1516   WBC 22 04*       Recent Labs     06/02/23  1724   LACTICACID 1 4     - Trend WBC with a m  labs  - Monitor off abx at this time, no known source of infection    Thrombocytopenia (HCC)  Assessment & Plan  Recent Labs     06/02/23  1516 06/03/23  0450   PLT 25* 21*     Patient currently on Xarelto due to atrial fibrillation requiring cardioversion < 4 weeks ago  Patient's daughter reports liver masses were seen during that admission but IR biopsy could not be performed due to anticoagulation  Unclear at this time whether patient would want biopsy performed  - Continue home xarelto at this time  - Transfuse platelets prophylactically if <20k, or <50k with active bleeding    Hypercalcemia  Assessment & Plan  Recent Labs     06/02/23  1516   ALB 2 7*   CALCIUM 10 3*   CORRECTEDCA 11 3*     - Trend Ca with a m  labs    Hyperthyroidism  Assessment & Plan  Lab Results   Component Value Date    FREET4 1 37 03/15/2023    [id number] 11 362 (H) 06/02/2023     Patient with history of hyperthyroidism, previously taking 2 5 mg of methimazole qod  However, TSH on presentation was elevated       - F/u free T4   - Home methimazole held on admission       VTE Pharmacologic Prophylaxis: VTE Score: 6 High Risk (Score >/= 5) - Pharmacological DVT Prophylaxis Ordered: rivaroxaban (Xarelto)  Sequential Compression Devices Ordered  Code Status: Level 3 - DNAR and DNI   Discussion with family: Updated  (daughter) at bedside  Anticipated Length of Stay: Patient will be admitted on an inpatient basis with an anticipated length of stay of greater than 2 midnights secondary to generalized weakness  Chief Complaint: AMS    History of Present Illness: Carmine Washburn is a 74 y o  female with a PMH of atrial fibrillation, gait instability with recent falls, liver masses who presents with altered mental status and generalized weakness  Daughter reports the patient has had multiple falls over the past few days while trying to get to the bathroom or falling out of bed, is on xarelto with bruising on her sides and back, is unable to perform ADLs  She has been increasingly confused/disoriented over the past week  She recently had an admission for a-fib to [facility] requiring cardioversion on 5/15 and has had a noted 20 lb weight loss between April and June  Daughter states during that hospitalization she was told that the patient needed to wait four weeks after cardioversion due to physical deconditioning before she would be appropriate for IR biopsy  While in the ED, labs were remarkable for WBC 22 04, Hgb 10 5, Plt 25; Na 132, Ca 10 3 vu 11 3, alk phos 305, total bili 2 55, TSH 11 362, UA with leukocytes  CT A/P with contrast showed enlarged heterogeneous appearance to the uterus concerning for primary uterine or endometrial carcinoma with apparent invasion to the adjacent posterior bladder wall as well as extensive hepatic metastases with apparent invasion of distal ureters bilaterally with corresponding moderate bilateral hydroureteronephrosis         Review of Systems:  Review of Systems   Unable to perform ROS: Mental status change   Constitutional: Positive for appetite change (decreased)  Musculoskeletal: Positive for gait problem  Neurological: Positive for weakness  Psychiatric/Behavioral: Positive for confusion  Past Medical and Surgical History:   Past Medical History:   Diagnosis Date   • Atrial fibrillation ([address] )    • Hypertension     with hyperthyroidism   • Osteoarthritis        Past Surgical History:   Procedure Laterality Date   • TUBAL LIGATION Bilateral        Meds/Allergies:  Prior to Admission medications    Medication Sig Start Date End Date Taking? Authorizing Provider   amiodarone 200 mg tablet 200 MG ORALLY DAILY FOR ARRHYTHMIA 5/28/23   Historical Provider, MD   b complex vitamins tablet Take 1 tablet by mouth daily    Historical Provider, MD   furosemide (LASIX) 40 mg tablet 40 MG ORALLY DAILY AS NEEDED FOR WEIGHT GAIN 5/17/23   Historical Provider, MD   lisinopril (ZESTRIL) 2 5 mg tablet Take by mouth daily  3/17/18   Historical Provider, MD   methimazole (TAPAZOLE) 5 mg tablet Take 0 5 tablets (2 5 mg total) by mouth every other day 9/19/22   Aditya eMdrano PA-C   metoprolol succinate (TOPROL-XL) 25 mg 24 hr tablet Take by mouth daily   3/17/18   Historical Provider, MD   mirtazapine (REMERON) 7 5 MG tablet 7 5 MG ORALLY AT BEDTIME 5/17/23   Historical Provider, MD Acosta Carlos 20 MG tablet Take 20 mg by mouth daily with breakfast   3/17/18   Historical Provider, MD     I have reviewed home medications with patient family member  Allergies:    Allergies   Allergen Reactions   • Sulfamethoxazole-Trimethoprim        Social History:  Marital Status:    Occupation: retired  Patient Pre-hospital Living Situation: Home  Patient Pre-hospital Level of Mobility: walks with walker  Patient Pre-hospital Diet Restrictions: none  Substance Use History:   Social History     Substance and Sexual Activity   Alcohol Use No     Social History     Tobacco Use   Smoking Status Every Day   • Packs/day: 0 50   • Years: 35 00   • Total pack years: 17 50   • Types: Cigarettes   Smokeless Tobacco Never     Social History     Substance and Sexual Activity   Drug Use No       Family History:  Family History   Problem Relation Age of Onset   • Thyroid disease unspecified Mother    • No Known Problems Father    • Hypothyroidism Sister    • Hashimoto's thyroiditis Sister    • Goiter Sister    • Rheum arthritis Brother    • Thyroid disease unspecified Sister    • Thyroid disease unspecified Sister    • No Known Problems Brother    • No Known Problems Brother    • No Known Problems Son    • No Known Problems Daughter        Physical Exam:     Vitals:   Blood Pressure: 122/67 (06/02/23 2034)  Pulse: 72 (06/02/23 2034)  Temperature: 98 2 °F (36 8 °C) (06/02/23 2034)  Temp Source: Oral (06/02/23 2034)  Respirations: 16 (06/02/23 2034)  SpO2: 99 % (06/02/23 2101)    Physical Exam  Constitutional:       General: She is not in acute distress  Appearance: She is ill-appearing  She is not diaphoretic  HENT:      Mouth/Throat:      Mouth: Mucous membranes are moist       Pharynx: Oropharynx is clear  No oropharyngeal exudate or posterior oropharyngeal erythema  Eyes:      General: No scleral icterus  Conjunctiva/sclera: Conjunctivae normal    Cardiovascular:      Rate and Rhythm: Normal rate and regular rhythm  Pulses: Normal pulses  Heart sounds: Normal heart sounds  Pulmonary:      Effort: Pulmonary effort is normal  No respiratory distress  Breath sounds: Normal breath sounds  No stridor  No wheezing, rhonchi or rales  Abdominal:      General: Bowel sounds are normal       Tenderness: There is no abdominal tenderness  There is no guarding  Musculoskeletal:         General: Normal range of motion  Cervical back: Normal range of motion  Right lower leg: No edema  Left lower leg: No edema  Skin:     General: Skin is warm and dry  Coloration: Skin is jaundiced  Neurological:      Mental Status: She is alert  Mental status is at baseline  Psychiatric:         Mood and Affect: Mood normal          Behavior: Behavior normal           Additional Data:     Lab Results:  Results from last 7 days   Lab Units 06/03/23  0450   EOS PCT % 1   HEMATOCRIT % 32 1*   HEMOGLOBIN g/dL 10 9*   LYMPHS PCT % 4*   MONOS PCT % 7   NEUTROS PCT % 86*   PLATELETS Thousands/uL 21*   WBC Thousand/uL 23 83*     Results from last 7 days   Lab Units 06/03/23  0450   ANION GAP mmol/L 11   ALBUMIN g/dL 2 5*   ALK PHOS U/L 276*   ALT U/L 25   AST U/L 46*   BUN mg/dL 16   CALCIUM mg/dL 10 0   CHLORIDE mmol/L 99   CO2 mmol/L 25   CREATININE mg/dL 0 56*   GLUCOSE RANDOM mg/dL 63*   POTASSIUM mmol/L 3 5   SODIUM mmol/L 135   TOTAL BILIRUBIN mg/dL 2 02*     Results from last 7 days   Lab Units 06/02/23  1516   INR  2 56*     Results from last 7 days   Lab Units 06/02/23  1524   POC GLUCOSE mg/dl 68         Results from last 7 days   Lab Units 06/02/23  1724   LACTIC ACID mmol/L 1 4       Lines/Drains:  Invasive Devices     Peripheral Intravenous Line  Duration           Peripheral IV 06/02/23 Left;Ventral (anterior) Forearm 1 day    Peripheral IV 06/02/23 Right;Ventral (anterior) Forearm <1 day                    Imaging: Reviewed radiology reports from this admission including: abdominal/pelvic CT and CT head  CT abdomen pelvis with contrast   Final Result by Camila Briones MD (06/02 1726)   Enlarged heterogeneous appearance to the uterus concerning for primary uterine or endometrial carcinoma with apparent invasion to the adjacent posterior bladder wall as well as extensive hepatic metastases  There is also apparent invasion of distal    ureters bilaterally with corresponding moderate bilateral hydroureteronephrosis  The study was marked in EPIC for significant notification  Workstation performed: [id number]         CT head without contrast   Final Result by Yocasta Wagner MD (06/02 1641)      No acute intracranial abnormality                    Workstation performed: [id number]         XR chest 1 view portable    (Results Pending)   XR sacrum and coccyx    (Results Pending)       EKG and Other Studies Reviewed on Admission:   · EKG: NSR  HR 77

## 2023-06-03 NOTE — ASSESSMENT & PLAN NOTE
Per family, patient has has multiple falls over the past few days while trying to get to the bathroom or falling out of bed     - Fall precautions, use walker at all times if not wheelchair  - PT/OT eval and treat

## 2023-06-03 NOTE — CONSULTS
[facility] Electronic Health Record Consult  IR has been consulted to evaluate the patient, determine the appropriate procedure, and whether or not a procedure can and should be performed regarding the care of Carmine Washburn  We were consulted by Gyn/onc concerning Alma Shawl, and to possibly perform a percutaneous liver biopsy if medically appropriate for the patient  The patient is aware that a specialty consultation is taking place, and agrees to the IR Consult on their behalf  Assessment/Plan:   75 yo female with pelvis mass and probable metastatic disease to the liver  Percutaneous liver biopsy is desired  Patient is currently thrombocytopenic and elevated INR  Biopsy can be performed if the coagulation abnormalities can be corrected  Communicated with Dr Samina Stanley  I tentatively have the patient NPO for Monday for possible procedure pending correction of the coagulopathy and platelets  I spent 28 minutes in medical consultative time evaluating the medical record and imaging of Carmine Washburn  Thank you for allowing [facility] to participate in the care of Carmine Washburn  Please don't hesitate to call or TigerText us with any questions       Anola Lanes, DO

## 2023-06-03 NOTE — ASSESSMENT & PLAN NOTE
Recent Labs     06/02/23  1516   WBC 22 04*       Recent Labs     06/02/23  1724   LACTICACID 1 4     - Trend WBC with a m  labs  - Monitor off abx at this time, no known source of infection

## 2023-06-03 NOTE — ASSESSMENT & PLAN NOTE
Recent Labs     06/02/23  1516   ALB 2 7*   CALCIUM 10 3*   CORRECTEDCA 11 3*     - Trend Ca with a malick  labs

## 2023-06-03 NOTE — ASSESSMENT & PLAN NOTE
Daughter reports the patient has has multiple falls over the past few days while trying to get to the bathroom or falling out of bed, is on xarelto with bruising on her sides and back, is unable to perform ADLs  She has been increasingly confused/disoriented over the past week  CT A/P: enlarged heterogeneous appearance to the uterus concerning for primary uterine or endometrial carcinoma with apparent invasion to the adjacent posterior bladder wall as well as extensive hepatic metastases with apparent invasion of distal ureters bilaterally with corresponding moderate bilateral hydroureteronephrosis       Ddx: 2/2 pelvic mass with probable liver metastases vs physical deconditioning vs hypothyroid vs infectious process    Recent Labs     06/02/23  1516   WBC 22 04*     Recent Labs     06/02/23  1516   HGB 10 5*     Lab Results   Component Value Date    FREET4 1 37 03/15/2023    [id number] 11 362 (H) 06/02/2023     - PT/OT eval and treat  - F/u free T4  - Fall precautions  - As below for pelvic mass

## 2023-06-03 NOTE — ASSESSMENT & PLAN NOTE
S/p cardioversion 5/15 in [facility], patient in NSR with in 70s at the time of this admission  Home meds  amiodarone, metoprolol and xarelto      - Continue home meds  - Consider holding xarelto pending morning plt count

## 2023-06-03 NOTE — ASSESSMENT & PLAN NOTE
S/p cardioversion 5/15 in [facility], patient in NSR with in 70s at the time of this admission  Home meds  amiodarone, metoprolol and xarelto      - Continue home meds  - Consider holding xarelto pending morning plt count

## 2023-06-03 NOTE — ASSESSMENT & PLAN NOTE
Per chart review patient opted against IR biopsy during last hospitalization  Per discussion with daughter, this was recommended to them and she was under the impression at-home strengthening was needed prior to biopsy  CT A/P with enlarged heterogeneous appearance to uterus concerning for primary uterine or endometrial carcinoma w/ apparent invasion to adjacent posterior bladder as well as extensive hepatic metastases with apparent invasion of distal ureters bilaterally with corresponding moderate bilateral hydroureteronephrosis      - Gyn consulted, appreciate recs  - Palliative care consulted for goals of care discussion, appreciate assistance  - Level 3 CODE status  - Consider IR biopsy pending goals of care

## 2023-06-03 NOTE — ASSESSMENT & PLAN NOTE
Lab Results   Component Value Date    FREET4 1 37 03/15/2023    [id number] 11 362 (H) 06/02/2023     Patient taking 2 5 mg of methimazole qod  However, TSH on presentation was elevated       - F/u free T4   - Home methimazole held on admission

## 2023-06-03 NOTE — UTILIZATION REVIEW
"Initial Clinical Review    Admission: Date/Time/Statement:   Admission Orders (From admission, onward)     Ordered        06/02/23 1945  INPATIENT ADMISSION  Once                      Orders Placed This Encounter   Procedures   • INPATIENT ADMISSION     Standing Status:   Standing     Number of Occurrences:   1     Order Specific Question:   Level of Care     Answer:   Med Surg [16]     Order Specific Question:   Estimated length of stay     Answer:   More than 2 Midnights     Order Specific Question:   Certification     Answer:   I certify that inpatient services are medically necessary for this patient for a duration of greater than two midnights  See H&P and MD Progress Notes for additional information about the patient's course of treatment  ED Arrival Information     Expected   -    Arrival   6/2/2023 14:59    Acuity   Urgent            Means of arrival   Ambulance    Escorted by   [facility]    Service   Hospitalist    Admission type   Emergency            Arrival complaint   -           Chief Complaint   Patient presents with   • Altered Mental Status     Patient arrived via EMS from home, they state patient has been off, dehydrated, dark urine, and irregular BM [diarrhea/constipation], had multiple falls in the last week sliding down and hitting bottom; pt denies any HS  +Xarelto  Daughter stated pt said she just \"doesn't feel right\"  Seen recently for CHF/AFIB on 5/11  Daughter reported pt has bruising on the sides/back  EMS reports BS 78       Initial Presentation: 74 y o  female with a PMH of atrial fibrillation, gait instability with recent falls, liver masses who presents with altered mental status and generalized weakness  Daughter reports the patient has had multiple falls over the past few days while trying to get to the bathroom or falling out of bed, is on xarelto with bruising on her sides and back, is unable to perform ADLs   She has been increasingly confused/disoriented over the past " week  She recently had an admission for a-fib to [facility] requiring cardioversion on 5/15 and has had a noted 20 lb weight loss between April and June  Daughter states during that hospitalization she was told that the patient needed to wait four weeks after cardioversion due to physical deconditioning before she would be appropriate for IR biopsy  CT A/P with contrast showed enlarged heterogeneous appearance to the uterus concerning for primary uterine or endometrial carcinoma with apparent invasion to the adjacent posterior bladder wall as well as extensive hepatic metastases with apparent invasion of distal ureters bilaterally with corresponding moderate bilateral hydroureteronephrosis  Plan: Inpatient admission for evaluation and treatment of generalized weakness, pelvic mass, Afib, leukocytosis, thrombocytopenia, hypercalcemia, hyperthyroidism: fall precautions, PT/OT eval, GYN consult, continue home amiodarone, metoprolol, consider holding Xarelto pending morning platelet count, trend WBC, hold methimazole  OB/GYN consult: I discussed that this likely represents metastatic gynecologic malignancy  Stage is 4B  Prognosis is poor based on performance status, thrombocytopenia, liver dysfunction  I discussed possible treatment options  They  understand that cytotoxic chemotherapy would not be able to be administered based on platelet counts, however, there are other treatment options such as hormone therapy, immunotherapy that may be utilized if her performance status allows  The patient and family understand that she has incurable disease and that all treatments are palliative  Date: 6/3   Day 2:     OB/GYN:  It needs to be determined what the goals of care are for this patient  Patient is a level 3, DNR  If the plan is to continue with work-up/treatments, patient should be transfused if the platelet count drops below 20K, higher if evidence of bleeding   An extensive hematologic work-up looking for specific cause(s) for the thrombocytopenia is not indicated at this time  Internal medicine: presented with generalized weakness and confusion in setting of UTI, found to have probable metastatic uterine cancer  After discussion with family, will pursue liver biopsy to determine if any treatment options are available  Family aware of poor prognosis given performance status  IR consulted; pt may require FFP and platelets pre procedure  Will confirm threshholds needed prior to procedure  If procedure is too dangerours, gyn onc to consider cervical biopsy  CT chest to complete staging  Will cont amiodarone, metoprolol, methimazole       ED Triage Vitals [06/02/23 1508]   Temperature Pulse Respirations Blood Pressure SpO2   97 9 °F (36 6 °C) 82 18 107/71 95 %      Temp Source Heart Rate Source Patient Position - Orthostatic VS BP Location FiO2 (%)   Oral Monitor Lying Right arm --      Pain Score       No Pain          Wt Readings from Last 1 Encounters:   06/01/23 54 4 kg (120 lb)     Additional Vital Signs:     Date/Time Temp Pulse Resp BP MAP (mmHg) SpO2 Calculated FIO2 (%) - Nasal Cannula Nasal Cannula O2 Flow Rate (L/min) O2 Device   06/03/23 0807 97 3 °F (36 3 °C) Abnormal  87 16 103/65 -- 96 % 28 2 L/min Nasal cannula   06/03/23 0715 -- -- -- -- -- -- 28 2 L/min Nasal cannula   06/02/23 2101 -- -- -- -- -- 99 % 36 4 L/min Nasal cannula   06/02/23 2100 -- -- -- -- -- 88 % Abnormal  -- -- None (Room air)   06/02/23 20:34:21 98 2 °F (36 8 °C) 72 16 122/67 85 96 % -- -- None (Room air)   06/02/23 1930 -- 80 16 114/63 82 100 % 36 4 L/min --   06/02/23 1900 -- 82 16 128/71 94 96 % 36 4 L/min Nasal cannula   06/02/23 1856 -- 79 16 111/63 81 97 % 36 4 L/min Nasal cannula   06/02/23 1830 -- 83 16 113/63 83 95 % -- -- None (Room air)   06/02/23 1700 -- 80 16 99/57 73 92 % -- -- None (Room air)     Pertinent Labs/Diagnostic Test Results:   CT abdomen pelvis with contrast   Final Result by Ar Hampton MD (06/02 4216) Enlarged heterogeneous appearance to the uterus concerning for primary uterine or endometrial carcinoma with apparent invasion to the adjacent posterior bladder wall as well as extensive hepatic metastases  There is also apparent invasion of distal    ureters bilaterally with corresponding moderate bilateral hydroureteronephrosis  The study was marked in EPIC for significant notification  Workstation performed: [id number]         XR chest 1 view portable   Final Result by Josh Lopez MD (06/03 9118)      No acute cardiopulmonary disease  Workstation performed: [id number]         XR sacrum and coccyx   Final Result by Josh Lopez MD (06/03 8330)      No acute osseous abnormality  Workstation performed: [id number]         CT head without contrast   Final Result by Cristofer Wilson MD (06/02 1641)      No acute intracranial abnormality                    Workstation performed: [id number]                 Results from last 7 days   Lab Units 06/03/23  0450 06/02/23  1516   HEMATOCRIT % 32 1* 31 6*   HEMOGLOBIN g/dL 10 9* 10 5*   NEUTROS ABS Thousands/µL 20 76* 19 10*   PLATELETS Thousands/uL 21* 25*   WBC Thousand/uL 23 83* 22 04*         Results from last 7 days   Lab Units 06/03/23  0450 06/02/23  1516   ANION GAP mmol/L 11 10   BUN mg/dL 16 16   CALCIUM mg/dL 10 0 10 3*   CHLORIDE mmol/L 99 95*   CO2 mmol/L 25 27   CREATININE mg/dL 0 56* 0 59*   EGFR ml/min/1 73sq m 92 90   POTASSIUM mmol/L 3 5 3 9   MAGNESIUM mg/dL  --  2 1   SODIUM mmol/L 135 132*     Results from last 7 days   Lab Units 06/03/23  0450 06/02/23  1521 06/02/23  1516   ALBUMIN g/dL 2 5*  --  2 7*   ALK PHOS U/L 276*  --  305*   ALT U/L 25  --  26   AMMONIA umol/L  --  35  --    AST U/L 46*  --  51*   TOTAL BILIRUBIN mg/dL 2 02*  --  2 55*   TOTAL PROTEIN g/dL 5 0*  --  5 2*     Results from last 7 days   Lab Units 06/02/23  1524   POC GLUCOSE mg/dl 68     Results from last 7 days   Lab Units 06/03/23  0450 06/02/23  1516   GLUCOSE RANDOM mg/dL 63* 69           Results from last 7 days   Lab Units 06/02/23  2053 06/02/23  1733 06/02/23  1516   HS TNI 0HR ng/L  --   --  57*   HS TNI 2HR ng/L  --  58*  --    HS TNI 4HR ng/L 67*  --   --    HSTNI D2 ng/L  --  1  --    HSTNI D4 ng/L 10  --   --          Results from last 7 days   Lab Units 06/02/23  1516   INR  2 56*   PROTIME seconds 27 7*   PTT seconds 51*     Results from last 7 days   Lab Units 06/02/23  1516   TSH 3RD GENERATON uIU/mL 11 362*         Results from last 7 days   Lab Units 06/02/23  1724   LACTIC ACID mmol/L 1 4             Results from last 7 days   Lab Units 06/02/23  1516   BNP pg/mL 304*           Results from last 7 days   Lab Units 06/02/23  1718   BACTERIA UA /hpf Occasional   BILIRUBIN UA  Negative   BLOOD UA  Large*   CLARITY UA  Turbid   COLOR UA  Yellow   EPITHELIAL CELLS WET PREP /hpf Occasional   GLUCOSE UA mg/dl Negative   KETONES UA mg/dl Trace*   LEUKOCYTES UA  Large*   NITRITE UA  Negative   PH UA  5 5   PROTEIN UA mg/dl 30 (1+)*   RBC UA /hpf 4-10*   SPEC GRAV UA  1 024   UROBILINOGEN UA (BE) mg/dl 4 0*   WBC UA /hpf Innumerable*         Results from last 7 days   Lab Units 06/02/23  1724   BLOOD CULTURE  Received in Microbiology Lab  Culture in Progress  Received in Microbiology Lab  Culture in Progress           ED Treatment:   Medication Administration from 06/02/2023 1459 to 06/02/2023 2021       Date/Time Order Dose Route Action     06/02/2023 1645 EDT iohexol (OMNIPAQUE) 350 MG/ML injection (SINGLE-DOSE) 100 mL 85 mL Intravenous Given     06/02/2023 1718 EDT sodium chloride 0 9 % bolus 1,000 mL 1,000 mL Intravenous New Bag     06/02/2023 1739 EDT ceftriaxone (ROCEPHIN) 1 g/50 mL in dextrose IVPB 1,000 mg Intravenous New Bag        Past Medical History:   Diagnosis Date   • Atrial fibrillation ([facility] [address] )    • Hypertension     with hyperthyroidism   • Osteoarthritis      Present on Admission:  • Atrial fibrillation [facility])  • Gait instability  • Weakness generalized  • Hyperthyroidism      Admitting Diagnosis: Pelvic mass [R19 00]  UTI (urinary tract infection) [N39 0]  Altered mental status [R41 82]  Elevated troponin [R77 8]  Acute encephalopathy [G93 40]  AMS (altered mental status) [R41 82]  Age/Sex: 74 y o  female  Admission Orders:  Scheduled Medications:  amiodarone, 200 mg, Oral, Daily With Breakfast  [START ON 6/4/2023] cefTRIAXone, 1,000 mg, Intravenous, Q24H  lisinopril, 2 5 mg, Oral, Daily  metoprolol succinate, 25 mg, Oral, Daily  mirtazapine, 7 5 mg, Oral, HS      Continuous IV Infusions:  sodium chloride, 75 mL/hr, Intravenous, Continuous      PRN Meds:  acetaminophen, 650 mg, Oral, Q6H PRN  oxyCODONE, 5 mg, Oral, Q6H PRN  oxyCODONE, 2 5 mg, Oral, Q6H PRN        IP CONSULT TO GYNECOLOGIC ONCOLOGY  IP CONSULT TO HEMATOLOGY  IP CONSULT TO PALLIATIVE CARE  INPATIENT CONSULT TO IR    Network Utilization Review Department  ATTENTION: Please call with any questions or concerns to [phone] and carefully listen to the prompts so that you are directed to the right person  All voicemails are confidential   Micaela Lazaro all requests for admission clinical reviews, approved or denied determinations and any other requests to dedicated fax number below belonging to the campus where the patient is receiving treatment   List of dedicated fax numbers for the Facilities:  [address] DENIALS (Administrative/Medical Necessity) [phone]   [address] (Maternity/NICU/Pediatrics) [phone]   6 Tari Santiago [address] [facility] [address] [facility] [place] [address] [place] [address] [place] [address] [address] [facility] 134 524 [facility] [phone]

## 2023-06-03 NOTE — ASSESSMENT & PLAN NOTE
Lab Results   Component Value Date    FREET4 1 37 03/15/2023    [id number] 11 362 (H) 06/02/2023     Patient with history of hyperthyroidism, previously taking 2 5 mg of methimazole qod  However, TSH on presentation was elevated       - F/u free T4   - Home methimazole held on admission

## 2023-06-03 NOTE — CONSULTS
Arlyn Monaco  1950    HEMATOLOGY/ONCOLOGY CONSULTATION REPORT    DISCUSSION/SUMMARY:    68-year-old female with progressive confusion, progressive falls, liver lesions, pelvic mass  Issues:    Pelvic mass, bilateral hydronephrosis  Patient has been seen/evaluated by gynecology  Work-up is in process  Obvious concern is malignancy  Patient may also need to be seen by urology as far as the hydronephrosis - see below  CBC abnormalities  The white count is elevated, likely secondary/reactive  Patient is being worked up for infection and is on ceftriaxone  The hemoglobin level is moderately decreased  This is also likely secondary to the acute process, pelvic mass, evidence of metastatic disease etc   Continue to monitor the trend  The platelet count is significantly decreased  Patient has some bruising but no signs of excessive bruising or bleeding  This is also likely associated with the above  There is a CBC from March 2022 - listed below - all parameters were within normal limits  It needs to be determined what the goals of care are for this patient  Patient is a level 3, DNR  If the plan is to continue with work-up/treatments, patient should be transfused if the platelet count drops below 20K, higher if evidence of bleeding  An extensive hematologic work-up looking for specific cause(s) for the thrombocytopenia is not indicated at this time  Goals of care  Patient/family should be offered nonemergent evaluation by palliative care if not already done so  If the plan is to be aggressive, as above, patient may need urology evaluation for stent placement  The above was discussed with the patient; all questions were answered  Will follow with you  Please do not hesitate to contact me if you have any questions or need additional information    Thank you for this consult     _________________________________________________________________________________    Chief Complaint "  Patient presents with   • Altered Mental Status     Patient arrived via EMS from home, they state patient has been off, dehydrated, dark urine, and irregular BM [diarrhea/constipation], had multiple falls in the last week sliding down and hitting bottom; pt denies any HS  +Xarelto  Daughter stated pt said she just \"doesn't feel right\"  Seen recently for CHF/AFIB on 5/11  Daughter reported pt has bruising on the sides/back  EMS reports BS 79     History of Present Illness: 67-year-old female admitted to [facility] on June 2, 2023 with mental status change  Patient has a history of atrial fibrillation, gait instability, recent falls, liver lesions, uterine mass  Mental status has decreased over the past few days, patient has apparently been more confused recently  Mrs Ally Lares was found in bed in no apparent distress      Review of Systems   Unable to perform ROS: Acuity of condition     Patient Active Problem List   Diagnosis   • Hyperthyroidism   • Graves disease   • Atrial fibrillation ([facility] [address] )   • Dysuria   • Cardiomyopathy [facility])   • Acute cystitis with hematuria   • Acute congestive heart failure (HCC)   • Liver masses   • Gait instability   • Fall at home   • Weakness generalized   • Leukocytosis   • Hypercalcemia   • Pelvic mass   • Thrombocytopenia (HCC)     Past Medical History:   Diagnosis Date   • Atrial fibrillation ([facility] [address] )    • Hypertension     with hyperthyroidism   • Osteoarthritis      Past Surgical History:   Procedure Laterality Date   • TUBAL LIGATION Bilateral      Family History   Problem Relation Age of Onset   • Thyroid disease unspecified Mother    • No Known Problems Father    • Hypothyroidism Sister    • Hashimoto's thyroiditis Sister    • Goiter Sister    • Rheum arthritis Brother    • Thyroid disease unspecified Sister    • Thyroid disease unspecified Sister    • No Known Problems Brother    • No Known Problems Brother    • No Known Problems Son    • No Known Problems Daughter  " Social History     Socioeconomic History   • Marital status:      Spouse name: Not on file   • Number of children: Not on file   • Years of education: Not on file   • Highest education level: Not on file   Occupational History   • Not on file   Tobacco Use   • Smoking status: Every Day     Packs/day: 0 50     Years: 35 00     Total pack years: 17 50     Types: Cigarettes   • Smokeless tobacco: Never   Vaping Use   • Vaping Use: Never used   Substance and Sexual Activity   • Alcohol use: No   • Drug use: No   • Sexual activity: Not on file   Other Topics Concern   • Not on file   Social History Narrative   • Not on file     Social Determinants of Health     Financial Resource Strain: Low Risk  (3/20/2023)    Overall Financial Resource Strain (CARDIA)    • Difficulty of Paying Living Expenses: Not hard at all   Food Insecurity: Not on file   Transportation Needs: No Transportation Needs (3/20/2023)    PRAPARE - Transportation    • Lack of Transportation (Medical): No    • Lack of Transportation (Non-Medical):  No   Physical Activity: Not on file   Stress: Not on file   Social Connections: Not on file   Intimate Partner Violence: Not on file   Housing Stability: Not on file       Current Facility-Administered Medications:   •  acetaminophen (TYLENOL) tablet 650 mg, 650 mg, Oral, Q6H PRN, Sandra Srinivasan DO  •  amiodarone tablet 200 mg, 200 mg, Oral, Daily With Breakfast, Sandra Srinivasan DO, 200 mg at 06/03/23 0803  •  [START ON 6/4/2023] ceftriaxone (ROCEPHIN) 1 g/50 mL in dextrose IVPB, 1,000 mg, Intravenous, Q24H, Suzanna Melo MD  •  lisinopril (ZESTRIL) tablet 2 5 mg, 2 5 mg, Oral, Daily, Sandra Srinivasan DO  •  metoprolol succinate (TOPROL-XL) 24 hr tablet 25 mg, 25 mg, Oral, Daily, Sandra Srinivasan DO  •  mirtazapine (REMERON) tablet 7 5 mg, 7 5 mg, Oral, HS, Sandra Srinivasan DO  •  oxyCODONE (ROXICODONE) IR tablet 5 mg, 5 mg, Oral, Q6H PRN, Suzanna Melo MD  •  oxyCODONE (ROXICODONE) split tablet 2 5 mg, 2 5 mg, Oral, Q6H PRN, Starla Valenzuela MD, 2 5 mg at 06/03/23 1254  •  sodium chloride 0 9 % infusion, 75 mL/hr, Intravenous, Continuous, Sandra Srinivasan DO, Last Rate: 75 mL/hr at 06/03/23 0302, 75 mL/hr at 06/03/23 0302    Allergies   Allergen Reactions   • Sulfamethoxazole-Trimethoprim        Vitals:    06/03/23 0807   BP: 103/65   Pulse: 87   Resp: 16   Temp: (!) 97 3 °F (36 3 °C)   SpO2: 96%    Physical Exam  HENT:      Head: Normocephalic and atraumatic  Right Ear: External ear normal       Left Ear: External ear normal    Cardiovascular:      Rate and Rhythm: Normal rate and regular rhythm  Heart sounds: Normal heart sounds  Pulmonary:      Comments: Bilateral scattered rhonchi  Abdominal:      General: Bowel sounds are normal       Palpations: Abdomen is soft  Skin:     Comments: Relatively good color, warm, moist, few scattered purpura   Neurological:      Deep Tendon Reflexes: Reflexes are normal and symmetric  Extremities: 0-1+ bilateral lower extremity ESTEVAN    (Limited physical exam)    Labs        BUN = 16 creatinine = 0 56 calcium = 10 0 AST = 46 ALT = 25 alkaline phosphatase = 276 total bilirubin = 2 02    3/7/2022 WBC = 7 6 hemoglobin = 14 5 hematocrit = 42 2 platelet = 673 neutrophil = 66% lymphocyte = 24% monocyte = 7%    Imaging    XR sacrum and coccyx    Result Date: 6/3/2023  Narrative: SACRUM AND COCCYX INDICATION:   fall  COMPARISON: Same-day CT abdomen/pelvis  VIEWS:  XR SACRUM AND COCCYX Images: 3 FINDINGS: There is no evidence of an acute fracture  Sacral arcuate lines are maintained  The sacroiliac joints appear symmetric  Erosive changes at the right pubic symphysis  Degenerative changes in the visualized lower lumbar spine  Impression: No acute osseous abnormality  Workstation performed: [id number]     XR chest 1 view portable    Result Date: 6/3/2023  Narrative: CHEST RADIOGRAPH INDICATION:   fall   COMPARISON:  None EXAM PERFORMED/VIEWS:  XR CHEST PORTABLE  Technique: AP portable semierect  Images:  1  FINDINGS: Lines/Tubes/Devices: None  Mediastinum: Cardiomediastinal silhouette appears within normal limits  Lungs: The lungs are clear  Pleura: No pleural effusion  No pneumothorax within the limitations of a portable exam  Bones: Osseous structures appear within normal limits for patient age  Impression: No acute cardiopulmonary disease  Workstation performed: [id number]     CT abdomen pelvis with contrast    Result Date: 6/2/2023  Narrative: CT ABDOMEN AND PELVIS WITH IV CONTRAST INDICATION:   elevated bili  History of hepatic metastases COMPARISON:  None  TECHNIQUE:  CT examination of the abdomen and pelvis was performed  Multiplanar 2D reformatted images were created from the source data  This examination, like all CT scans performed in the [facility], was performed utilizing techniques to minimize radiation dose exposure, including the use of iterative reconstruction and automated exposure control  Radiation dose length product (DLP) for this visit:  498 mGy-cm IV Contrast:  85 mL of iohexol (OMNIPAQUE) Enteric Contrast:  Enteric contrast was not administered  FINDINGS: ABDOMEN LOWER CHEST:  No clinically significant abnormality identified in the visualized lower chest  LIVER/BILIARY TREE: Innumerable hypoattenuating lesions throughout the liver, most of which demonstrate some peripheral enhancement and bulging of the hepatic capsule  Findings are highly suspicious for metastatic disease  GALLBLADDER:  No calcified gallstones  No pericholecystic inflammatory change  SPLEEN:  Unremarkable  PANCREAS:  Unremarkable  ADRENAL GLANDS:  Unremarkable  KIDNEYS/URETERS: Moderate hydroureteronephrosis bilaterally with soft tissue attenuation involving the distal ureters bilaterally which appears to reflect a component of uterine/endometrial invasion of the bladder   STOMACH AND BOWEL:  There is colonic diverticulosis without evidence of acute diverticulitis  APPENDIX:  No findings to suggest appendicitis  ABDOMINOPELVIC CAVITY: Mild left pericolonic free fluid evident  VESSELS:  Unremarkable for patient's age  PELVIS REPRODUCTIVE ORGANS: Enlarged heterogeneous appearance to the uterus concerning for region of primary carcinoma with apparent invasion to the adjacent posterior wall of the bladder well depicted on 602/101 URINARY BLADDER:  Unremarkable  ABDOMINAL WALL/INGUINAL REGIONS: Small hernia containing fat and mild free fluid  OSSEOUS STRUCTURES:  No acute fracture or destructive osseous lesion  Impression: Enlarged heterogeneous appearance to the uterus concerning for primary uterine or endometrial carcinoma with apparent invasion to the adjacent posterior bladder wall as well as extensive hepatic metastases  There is also apparent invasion of distal ureters bilaterally with corresponding moderate bilateral hydroureteronephrosis  The study was marked in EPIC for significant notification  Workstation performed: [id number]     CT head without contrast    Result Date: 6/2/2023  Narrative: CT BRAIN - WITHOUT CONTRAST INDICATION:   Ataxia, head trauma fall  COMPARISON:  None  TECHNIQUE:  CT examination of the brain was performed  Multiplanar 2D reformatted images were created from the source data  Radiation dose length product (DLP) for this visit:  965 mGy-cm   This examination, like all CT scans performed in the [facility], was performed utilizing techniques to minimize radiation dose exposure, including the use of iterative reconstruction and automated exposure control  IMAGE QUALITY:  Diagnostic  FINDINGS: PARENCHYMA: Decreased attenuation is noted in periventricular and subcortical white matter demonstrating an appearance that is statistically most likely to represent mild microangiopathic change  No CT signs of acute infarction  No intracranial mass, mass effect or midline shift    No acute parenchymal hemorrhage  VENTRICLES AND EXTRA-AXIAL SPACES:  Normal for the patient's age  VISUALIZED ORBITS: Normal visualized orbits  PARANASAL SINUSES: Normal visualized paranasal sinuses  CALVARIUM AND EXTRACRANIAL SOFT TISSUES:  Normal      Impression: No acute intracranial abnormality  Workstation performed: [id number]     I reviewed the above laboratory and imaging data

## 2023-06-03 NOTE — ASSESSMENT & PLAN NOTE
Per family, patient has has multiple falls over the past few days while trying to get to the bathroom or falling out of bed     - Fall precautions, use walker at all times  - PT/OT eval and treat

## 2023-06-03 NOTE — ASSESSMENT & PLAN NOTE
Daughter reports the patient has had multiple falls over the past few days while trying to get to the bathroom or falling out of bed, is on xarelto with bruising on her sides and back, is unable to perform ADLs  She has been increasingly confused/disoriented over the past week  CT A/P: enlarged heterogeneous appearance to the uterus concerning for primary uterine or endometrial carcinoma with apparent invasion to the adjacent posterior bladder wall as well as extensive hepatic metastases with apparent invasion of distal ureters bilaterally with corresponding moderate bilateral hydroureteronephrosis       Ddx: 2/2 pelvic mass with probable liver metastases vs physical deconditioning vs hypothyroid vs infectious process    Recent Labs     06/02/23  1516   WBC 22 04*     Recent Labs     06/02/23  1516   HGB 10 5*     Lab Results   Component Value Date    FREET4 1 37 03/15/2023    [id number] 11 362 (H) 06/02/2023     - PT/OT eval and treat  - F/u free T4  - Fall precautions  - As below for pelvic mass

## 2023-06-03 NOTE — ASSESSMENT & PLAN NOTE
Per chart review patient opted against IR biopsy during last hospitalization for atrial fibrillation  Per discussion with daughter, this was recommended to them and she was under the impression at-home strengthening was needed prior to biopsy  CT A/P with enlarged heterogeneous appearance to uterus concerning for primary uterine or endometrial carcinoma w/ apparent invasion to adjacent posterior bladder as well as extensive hepatic metastases with apparent invasion of distal ureters bilaterally with corresponding moderate bilateral hydroureteronephrosis      - Gyn consulted, appreciate recs  - Palliative care consulted for goals of care discussion, appreciate assistance  - Level 3 CODE status  - Consider IR biopsy pending goals of care

## 2023-06-03 NOTE — PLAN OF CARE
Problem: MOBILITY - ADULT  Goal: Maintain or return to baseline ADL function  Description: INTERVENTIONS:  -  Assess patient's ability to carry out ADLs; assess patient's baseline for ADL function and identify physical deficits which impact ability to perform ADLs (bathing, care of mouth/teeth, toileting, grooming, dressing, etc )  - Assess/evaluate cause of self-care deficits   - Assess range of motion  - Assess patient's mobility; develop plan if impaired  - Assess patient's need for assistive devices and provide as appropriate  - Encourage maximum independence but intervene and supervise when necessary  - Involve family in performance of ADLs  - Assess for home care needs following discharge   - Consider OT consult to assist with ADL evaluation and planning for discharge  - Provide patient education as appropriate  Outcome: Progressing  Goal: Maintains/Returns to pre admission functional level  Description: INTERVENTIONS:  - Perform BMAT or MOVE assessment daily    - Set and communicate daily mobility goal to care team and patient/family/caregiver     - Collaborate with rehabilitation services on mobility goals if consulted  - Out of bed for toileting  - Record patient progress and toleration of activity level   Outcome: Progressing     Problem: Prexisting or High Potential for Compromised Skin Integrity  Goal: Skin integrity is maintained or improved  Description: INTERVENTIONS:  - Identify patients at risk for skin breakdown  - Assess and monitor skin integrity  - Assess and monitor nutrition and hydration status  - Monitor labs   - Assess for incontinence   - Turn and reposition patient  - Assist with mobility/ambulation  - Relieve pressure over bony prominences  - Avoid friction and shearing  - Provide appropriate hygiene as needed including keeping skin clean and dry  - Evaluate need for skin moisturizer/barrier cream  - Collaborate with interdisciplinary team   - Patient/family teaching  - Consider wound care consult   Outcome: Progressing     Problem: PAIN - ADULT  Goal: Verbalizes/displays adequate comfort level or baseline comfort level  Description: Interventions:  - Encourage patient to monitor pain and request assistance  - Assess pain using appropriate pain scale  - Administer analgesics based on type and severity of pain and evaluate response  - Implement non-pharmacological measures as appropriate and evaluate response  - Consider cultural and social influences on pain and pain management  - Notify physician/advanced practitioner if interventions unsuccessful or patient reports new pain  Outcome: Progressing     Problem: INFECTION - ADULT  Goal: Absence or prevention of progression during hospitalization  Description: INTERVENTIONS:  - Assess and monitor for signs and symptoms of infection  - Monitor lab/diagnostic results  - Monitor all insertion sites, i e  indwelling lines, tubes, and drains  - Monitor endotracheal if appropriate and nasal secretions for changes in amount and color  - [place] appropriate cooling/warming therapies per order  - Administer medications as ordered  - Instruct and encourage patient and family to use good hand hygiene technique  - Identify and instruct in appropriate isolation precautions for identified infection/condition  Outcome: Progressing  Goal: Absence of fever/infection during neutropenic period  Description: INTERVENTIONS:  - Monitor WBC    Outcome: Progressing     Problem: SAFETY ADULT  Goal: Maintain or return to baseline ADL function  Description: INTERVENTIONS:  -  Assess patient's ability to carry out ADLs; assess patient's baseline for ADL function and identify physical deficits which impact ability to perform ADLs (bathing, care of mouth/teeth, toileting, grooming, dressing, etc )  - Assess/evaluate cause of self-care deficits   - Assess range of motion  - Assess patient's mobility; develop plan if impaired  - Assess patient's need for assistive devices and provide as appropriate  - Encourage maximum independence but intervene and supervise when necessary  - Involve family in performance of ADLs  - Assess for home care needs following discharge   - Consider OT consult to assist with ADL evaluation and planning for discharge  - Provide patient education as appropriate  Outcome: Progressing  Goal: Maintains/Returns to pre admission functional level  Description: INTERVENTIONS:  - Perform BMAT or MOVE assessment daily    - Set and communicate daily mobility goal to care team and patient/family/caregiver     - Collaborate with rehabilitation services on mobility goals if consulted  - Out of bed for toileting  - Record patient progress and toleration of activity level   Outcome: Progressing  Goal: Patient will remain free of falls  Description: INTERVENTIONS:  - Educate patient/family on patient safety including physical limitations  - Instruct patient to call for assistance with activity   - Consult OT/PT to assist with strengthening/mobility   - Keep Call bell within reach  - Keep bed low and locked with side rails adjusted as appropriate  - Keep care items and personal belongings within reach  - Initiate and maintain comfort rounds  - Make Fall Risk Sign visible to staff  - Apply yellow socks and bracelet for high fall risk patients  - Consider moving patient to room near nurses station  Outcome: Progressing     Problem: DISCHARGE PLANNING  Goal: Discharge to home or other facility with appropriate resources  Description: INTERVENTIONS:  - Identify barriers to discharge w/patient and caregiver  - Arrange for needed discharge resources and transportation as appropriate  - Identify discharge learning needs (meds, wound care, etc )  - Arrange for interpretive services to assist at discharge as needed  - Refer to Case Management Department for coordinating discharge planning if the patient needs post-hospital services based on physician/advanced practitioner order or complex needs related to functional status, cognitive ability, or social support system  Outcome: Progressing     Problem: Knowledge Deficit  Goal: Patient/family/caregiver demonstrates understanding of disease process, treatment plan, medications, and discharge instructions  Description: Complete learning assessment and assess knowledge base    Interventions:  - Provide teaching at level of understanding  - Provide teaching via preferred learning methods  Outcome: Progressing

## 2023-06-03 NOTE — ASSESSMENT & PLAN NOTE
Recent Labs     06/02/23  1516 06/03/23  0450   PLT 25* 21*     Patient currently on Xarelto due to atrial fibrillation requiring cardioversion < 4 weeks ago  Patient's daughter reports liver masses were seen during that admission but IR biopsy could not be performed due to anticoagulation  Unclear at this time whether patient would want biopsy performed      - Continue home xarelto at this time  - Transfuse platelets prophylactically if <20k, or <50k with active bleeding

## 2023-06-04 NOTE — DISCHARGE SUMMARY
Discharge Summary - Beau Chavis 74 y o  female MRN: [id number]    Unit/Bed#: S -01 Encounter: [id number] PCP: Gilbert Lara    Admission Date:   Admission Orders (From admission, onward)     Ordered        06/02/23 1945  INPATIENT ADMISSION  Once                        Admitting Diagnosis: Pelvic mass [R19 00]  UTI (urinary tract infection) [N39 0]  Altered mental status [R41 82]  Elevated troponin [R77 8]  Acute encephalopathy [G93 40]  AMS (altered mental status) [R41 82]    HPI: Beau Chavis is a 74 y o  female with a PMH of atrial fibrillation, gait instability with recent falls, liver masses who presents with altered mental status and generalized weakness  Daughter reports the patient has had multiple falls over the past few days while trying to get to the bathroom or falling out of bed, is on xarelto with bruising on her sides and back, is unable to perform ADLs  She has been increasingly confused/disoriented over the past week  She recently had an admission for a-fib to [facility] requiring cardioversion on 5/15 and has had a noted 20 lb weight loss between April and June  Daughter states during that hospitalization she was told that the patient needed to wait four weeks after cardioversion due to physical deconditioning before she would be appropriate for IR biopsy           Procedures Performed:   Orders Placed This Encounter   Procedures   • ED ECG Documentation Only       Summary of Hospital Course: While in the ED, labs were remarkable for WBC 22 04, Hgb 10 5, Plt 25; Na 132, Ca 10 3 vu 11 3, alk phos 305, total bili 2 55, TSH 11 362, UA with leukocytes    CT A/P with contrast showed enlarged heterogeneous appearance to the uterus concerning for primary uterine or endometrial carcinoma with apparent invasion to the adjacent posterior bladder wall as well as extensive hepatic metastases with apparent invasion of distal ureters bilaterally with corresponding moderate bilateral hydroureteronephrosis  IR was consulted for possible liver BX however it was not done due to low platelet levels  On 2023, over night the patient was found unresponsive with no pulses by the nursing team  Rapid was called but patient was DNR DNI  Family was notified  Significant Findings, Care, Treatment and Services Provided:   XR sacrum and coccyx    Result Date: 6/3/2023  Impression: No acute osseous abnormality  Workstation performed: [id number]     XR chest 1 view portable    Result Date: 6/3/2023  Impression: No acute cardiopulmonary disease  Workstation performed: [id number]     CT abdomen pelvis with contrast    Result Date: 2023  Impression: Enlarged heterogeneous appearance to the uterus concerning for primary uterine or endometrial carcinoma with apparent invasion to the adjacent posterior bladder wall as well as extensive hepatic metastases  There is also apparent invasion of distal ureters bilaterally with corresponding moderate bilateral hydroureteronephrosis  The study was marked in EPIC for significant notification  Workstation performed: [id number]     CT head without contrast    Result Date: 2023  Impression: No acute intracranial abnormality  Workstation performed: [id number]       XR chest 1 view portable    Result Date: 6/3/2023  Impression No acute cardiopulmonary disease  Workstation performed: [id number]       While in the ED, labs were remarkable for WBC 22 04, Hgb 10 5, Plt 25; Na 132, Ca 10 3 vu 11 3, alk phos 305, total bili 2 55, TSH 11 362, UA with leukocytes  Complications: none    Disposition:      Final Diagnosis: Uterine mass with suspected metastasis to liver  Medical Problems     Resolved Problems  Date Reviewed: 2023   None         Condition at Time of Death: Marked leukocytosis, thrombocytopenia, annemia, Elevated AST, Elevated alk phos  Possible metastasis   Poor prognosis    Date, Time and Cause of Death    Date of Death: 23  Time of Death:  5:55 AM  Preliminary Cause of Death: Multi-organ failure with heart failure ([facility] [address] )  Entered by: Frederick Zacarias[SM1 1]     Attribution     SM1 1 Darnell Escobar MD 23 05:56          Death Note:    INPATIENT DEATH NOTE  Elizabeth Coad 74 y o  female MRN: [id number]  Unit/Bed#: S -01 Encounter: [id number]    Date, Time and Cause of Death    Date of Death: 23  Time of Death:  5:55 AM  Preliminary Cause of Death: Multi-organ failure with heart failure ([facility] [address] )  Entered by: Frederick Zacarias[SM1 1]     Attribution     SM1 1 Darnell Escobar MD 23 05:56           Patient's Information  Pronounced by: Surendra Tafoya  Did the patient's death occur in the ED?: No  Did the patient's death occur in the OR?: No  Did the patient's death occur less than 10 days post-op?: No  Did the patient's death occur within 24 hours of admission?: No  Was code status DNR at the time of death?: Yes    PHYSICAL EXAM:  Unresponsive to noxious stimuli, Spontaneous respirations absent, Breath sounds absent, Carotid pulse absent, Heart sounds absent, Pupillary light reflex absent and Corneal blink reflex absent    Medical Examiner notification criteria:  NONE APPLICABLE   Medical Examiner's office notified?:  No, does not meet ME notification criteria   Medical Examiner accepted case?:  No  Name of Medical Examiner: N/a    Family Notification  Was the family notified?: Yes  Date Notified: 23  Notified by: Surendra Tafoya  Name of Family Notified of Death: Gail Ilia   Relationship to Patient: Daughter  Family Notification Route: Telephone  Was the family told to contact a  home?: Yes  Name of [facility][de-identified] Patient is undecided    Autopsy Options: Next of kin does not want autopsy    Primary Service Attending Physician notified?:  yes - Attending:  Michael Estrella MD    Physician/Resident responsible for completing Discharge Summary:  Brando Segovia

## 2023-06-04 NOTE — RAPID RESPONSE
Rapid Response Note  Elizabeth Staley 74 y o  female MRN: [id number]  Unit/Bed#: S -01 Encounter: [id number]    Rapid Response Notification(s):   Response called date/time:  2023 5:49 AM  Response team arrival date/time:  2023 5:51 AM  Response end date/time:  2023 5:52 AM  Level of care:  Medsurg  [facility]  Primary reason for rapid response call: Other (comment) (Patient without pulse or respirations)    Rapid Response Intervention(s):   Airway:  None  Breathing:  None  Circulation:  None  Fluids administered:  None  Medications administered:  None         Rapid Response Outcome:   Transfer:  Other (comment)  Code Status: Level 3 (DNAR and DNI)    Comments:  Patient   Family notified of transfer: no  Family member contacted: SLIM to notfy family of patient passing     Background/Situation:   Elizabeth Staley is a 74 y o  female who PMH of  A-fib on AC , hyperthyroidism, and CHF who presented with generalized weakness and confusion at home  Patient had a stay at [facility] recently due to decompensated CHF and A-fib with RVR  Patient required cardioversion during that admission and was discharged home but remains deconditioned  Since her  discharge she has had increased weakness, weight loss and now confusion  Rapid called at this time when nursing staff found her without a pulse  Primary team present and will notify family as patient was a DNR/DNI  Review of Systems   Unable to perform ROS: Other (Patient    No interventions performed )       Objective:   Vitals:    23 1616 23 1857 23 1920 23 2105   BP: 103/64 112/65 107/58 111/63   BP Location:   Left arm    Pulse:  86 90 79   Resp: 17 16 18 17   Temp: (!) 97 2 °F (36 2 °C) 98 °F (36 7 °C) 97 5 °F (36 4 °C) 97 6 °F (36 4 °C)   TempSrc:   Axillary Oral   SpO2:   95% 96%     Physical Exam deferred secondary to patient  with DNR/ DNI status "    Portions of the record may have been created with voice recognition software  Occasional wrong word or \"sound a like\" substitutions may have occurred due to the inherent limitations of voice recognition software  Read the chart carefully and recognize, using context, where substitutions have occurred      AYDY Zurita    "

## 2023-06-05 NOTE — UTILIZATION REVIEW
NOTIFICATION OF INPATIENT ADMISSION   AUTHORIZATION REQUEST   SERVICING FACILITY:   [address]  Tax ID: [id number]  NPI: [id number]   ATTENDING PROVIDER:  Attending Name and NPI#: Jaymie Norwood Md [[id number]]  Address: [address] [place] [address] Dr Jean Marie kay  [address]  Phone: [phone]     ADMISSION INFORMATION:  Place of Service: Inpatient [address] [place] [address] Code: 21  Inpatient Admission Date/Time: 6/2/23  7:45 PM  Discharge Date/Time: 6/4/2023 11:56 AM  Admitting Diagnosis Code/Description:  Pelvic mass [R19 00]  UTI (urinary tract infection) [N39 0]  Altered mental status [R41 82]  Elevated troponin [R77 8]  Acute encephalopathy [G93 40]  AMS (altered mental status) [R41 82]     UTILIZATION REVIEW CONTACT:  Davidson Gomez Utilization   Network Utilization Review Department  Phone: [phone]  Fax: [phone]  Email: [email or web address]  Contact for approvals/pending authorizations, clinical reviews, and discharge  PHYSICIAN ADVISORY SERVICES:  Medical Necessity Denial & [employer]fe[employer] Review  Phone: [phone]  Fax: [phone]  Email: [email or web address]

## 2023-06-08 LAB
BACTERIA BLD CULT: NORMAL
BACTERIA BLD CULT: NORMAL